# Patient Record
Sex: FEMALE | Race: BLACK OR AFRICAN AMERICAN | NOT HISPANIC OR LATINO | Employment: OTHER | URBAN - METROPOLITAN AREA
[De-identification: names, ages, dates, MRNs, and addresses within clinical notes are randomized per-mention and may not be internally consistent; named-entity substitution may affect disease eponyms.]

---

## 2018-03-07 ENCOUNTER — OFFICE VISIT (OUTPATIENT)
Dept: PODIATRY | Facility: CLINIC | Age: 63
End: 2018-03-07
Payer: MEDICARE

## 2018-03-07 VITALS — HEIGHT: 64 IN | WEIGHT: 293 LBS | BODY MASS INDEX: 50.02 KG/M2

## 2018-03-07 DIAGNOSIS — E11.9 COMPREHENSIVE DIABETIC FOOT EXAMINATION, TYPE 2 DM, ENCOUNTER FOR: Primary | ICD-10-CM

## 2018-03-07 DIAGNOSIS — L84 CORN OR CALLUS: ICD-10-CM

## 2018-03-07 DIAGNOSIS — M20.42 HAMMER TOES OF BOTH FEET: ICD-10-CM

## 2018-03-07 DIAGNOSIS — M20.41 HAMMER TOES OF BOTH FEET: ICD-10-CM

## 2018-03-07 DIAGNOSIS — E11.49 TYPE II DIABETES MELLITUS WITH NEUROLOGICAL MANIFESTATIONS: ICD-10-CM

## 2018-03-07 PROCEDURE — 99213 OFFICE O/P EST LOW 20 MIN: CPT | Mod: PBBFAC,PO | Performed by: PODIATRIST

## 2018-03-07 PROCEDURE — 99999 PR PBB SHADOW E&M-EST. PATIENT-LVL III: CPT | Mod: PBBFAC,,, | Performed by: PODIATRIST

## 2018-03-07 PROCEDURE — 99204 OFFICE O/P NEW MOD 45 MIN: CPT | Mod: S$PBB,,, | Performed by: PODIATRIST

## 2018-03-07 RX ORDER — ASPIRIN 325 MG
TABLET, DELAYED RELEASE (ENTERIC COATED) ORAL
COMMUNITY
Start: 2015-11-24

## 2018-03-07 RX ORDER — PROMETHAZINE HYDROCHLORIDE 25 MG/1
TABLET ORAL
COMMUNITY
Start: 2015-12-08

## 2018-03-07 RX ORDER — INSULIN ASPART 100 [IU]/ML
INJECTION, SOLUTION INTRAVENOUS; SUBCUTANEOUS
COMMUNITY
Start: 2015-12-23

## 2018-03-07 RX ORDER — LEVOCETIRIZINE DIHYDROCHLORIDE 5 MG/1
TABLET, FILM COATED ORAL
COMMUNITY
Start: 2015-12-24

## 2018-03-07 RX ORDER — OXYCODONE HYDROCHLORIDE 30 MG/1
TABLET ORAL
COMMUNITY
Start: 2018-03-02

## 2018-03-07 RX ORDER — FLUTICASONE PROPIONATE 50 MCG
SPRAY, SUSPENSION (ML) NASAL
COMMUNITY
Start: 2018-01-07

## 2018-03-07 RX ORDER — ALPRAZOLAM 1 MG/1
TABLET ORAL
COMMUNITY
Start: 2015-12-23

## 2018-03-07 RX ORDER — METOPROLOL TARTRATE 50 MG/1
TABLET ORAL
COMMUNITY
Start: 2015-11-30

## 2018-03-07 RX ORDER — PITAVASTATIN CALCIUM 2.09 MG/1
TABLET, FILM COATED ORAL
COMMUNITY
Start: 2017-11-30

## 2018-03-07 NOTE — PROGRESS NOTES
Subjective:     Patient ID: Sandra Sanders is a 62 y.o. female.    Chief Complaint: Diabetic Foot Exam (establishing care PCP Dr. Corral ) and Foot Pain (bilateral ball of foot pain left worse than right)    Sandra Gutierrez is a 62 y.o. female who presents to the clinic for evaluation and treatment of high risk feet. Sandra Gutierrez has a past medical history of CHF (congestive heart failure); Diabetes mellitus, type 2; and Hyperlipidemia. The patient's chief complaint is painfil left 4th toe. This patient has documented high risk feet requiring routine maintenance secondary to diabetes mellitis and those secondary complications of diabetes, as mentioned..    PCP: Juan Diego Corral MD    Date Last Seen by PCP: 1/4/2018    Current shoe gear:  Affected Foot: Extra depth shoes     Unaffected Foot: Extra depth shoes      Patient Active Problem List   Diagnosis    Type II diabetes mellitus with neurological manifestations    Hyperlipemia    CHF (congestive heart failure)       Medication List with Changes/Refills   Current Medications    ALPRAZOLAM (XANAX) 1 MG TABLET        CHOLECALCIFEROL, VITAMIN D3, (DECARA) 50,000 UNIT CAPSULE        FLUTICASONE (FLONASE) 50 MCG/ACTUATION NASAL SPRAY        INSULIN ASPART U-100 (NOVOLOG FLEXPEN U-100 INSULIN) 100 UNIT/ML INPN PEN        INSULIN DETEMIR U-100 (LEVEMIR FLEXTOUCH U-100 INSULN) 100 UNIT/ML (3 ML) SUBQ INPN PEN        LEVOCETIRIZINE (XYZAL) 5 MG TABLET        LIVALO 2 MG TAB TABLET        METOPROLOL TARTRATE (LOPRESSOR) 50 MG TABLET        PROMETHAZINE (PHENERGAN) 25 MG TABLET        QVAR 40 MCG/ACTUATION AERO    INHALE 2 PUFFS BY MOUTH TWICE DAILY    ROXICODONE 30 MG TAB           Review of patient's allergies indicates:  Allergies not on file    Past Surgical History:   Procedure Laterality Date    BYPASS GRAFT      HIP SURGERY      HYSTERECTOMY         History reviewed. No pertinent family history.    Social History     Social History    Marital status:   "    Spouse name: N/A    Number of children: N/A    Years of education: N/A     Occupational History    Not on file.     Social History Main Topics    Smoking status: Former Smoker    Smokeless tobacco: Never Used    Alcohol use Not on file    Drug use: Unknown    Sexual activity: Not on file     Other Topics Concern    Not on file     Social History Narrative    No narrative on file       Vitals:    03/07/18 1340   Weight: (!) 146.4 kg (322 lb 12.1 oz)   Height: 5' 4" (1.626 m)       Review of Systems   Constitutional: Negative for chills and fever.   Respiratory: Negative for shortness of breath.    Cardiovascular: Negative for chest pain, palpitations, orthopnea, claudication and leg swelling.   Gastrointestinal: Negative for diarrhea, nausea and vomiting.   Musculoskeletal: Negative for joint pain.   Skin: Negative for rash.   Neurological: Positive for tingling and sensory change. Negative for dizziness, focal weakness and weakness.   Psychiatric/Behavioral: Negative.              Objective:      PHYSICAL EXAM: Apperance: Alert and orient in no distress,well developed, and with good attention to grooming and body habits  Patient presents in a wheelchair.   LOWER EXTREMITY EXAM:  VASCULAR: Dorsalis pedis pulses 2/4 bilateral and Posterior Tibial pulses 1/4 bilateral. Capillary fill time <4 seconds bilateral. Mild edema observed bilateral. Varicosities absent bilateral. Skin temperature of the lower extremities is warm to warm, proximal to distal. Hair growth dim bilateral.  DERMATOLOGICAL: No skin rashes, subcutaneous nodules, lesions, or ulcers observed bilateral. Nails 1,2,3,4,5 bilateral normal lenght. Webspaces 1,2,3,4 clean, dry and without evidence of break in skin integrity bilateral. Mild hyperkeratotic tissue noted to left plantar 5th submetatarsal.   NEUROLOGICAL: Light touch, sharp-dull, proprioception all present and equal bilaterally.  Vibratory sensation absent at bilateral hallux. " Protective sensation absent at 4/10 sites as tested with a Hazelton-Sy 5.07 monofilament.   MUSCULOSKELETAL: Muscle strength is 5/5 for foot inverters, everters, plantarflexors, and dorsiflexors. Muscle tone is normal. Hammertoes noted bilateral.             Assessment:       Encounter Diagnoses   Name Primary?    Comprehensive diabetic foot examination, type 2 DM, encounter for Yes    Type II diabetes mellitus with neurological manifestations     Hammer toes of both feet     Corn or callus - Left Foot          Plan:   Comprehensive diabetic foot examination, type 2 DM, encounter for    Type II diabetes mellitus with neurological manifestations  -     DIABETIC SHOES FOR HOME USE    Hammer toes of both feet  -     DIABETIC SHOES FOR HOME USE    Corn or callus - Left Foot  -     DIABETIC SHOES FOR HOME USE      I counseled the patient on her conditions, regarding findings of my examination, my impressions, and usual treatment plan.   Greater than 50% of this visit spent on counseling and coordination of care.  Greater than 15 minutes of a 20 minute appointment spent on education about the diabetic foot, neuropathy, and prevention of limb loss.  Shoe inspection. Diabetic Foot Education. Patient reminded of the importance of good nutrition and blood sugar control to help prevent podiatric complications of diabetes. Patient instructed on proper foot hygeine. We discussed wearing proper shoe gear, daily foot inspections, never walking without protective shoe gear, never putting sharp instruments to feet.    Prescription written for Diabetic shoes and inserts.   Patient  will continue to monitor the areas daily, inspect feet, wear protective shoe gear when ambulatory, moisturizer to maintain skin integrity. Patient reminded of the importance of good nutrition and blood sugar control to help prevent podiatric complications of diabetes.  Patient to return 3 months or sooner if needed.                 Frances  Miles, DPM [facility]

## 2018-03-07 NOTE — PATIENT INSTRUCTIONS
[facility]    [address] [place] [address]  Phone: [phone] [address] [place] [address]   Phone: [phone] [address] [place] [address]   Phone: [phone] [address]  [place] [address]   Phone: [phone]    [address] [place] [address]   Phone: [phone] [address] [place] [address]   Phone: [phone]      [facility]    [address] [facility]  [place] [address]  Phone: [phone]  Fax: [phone]

## 2018-03-17 PROBLEM — E11.49 TYPE II DIABETES MELLITUS WITH NEUROLOGICAL MANIFESTATIONS: Status: ACTIVE | Noted: 2018-03-17

## 2018-03-17 PROBLEM — I50.9 CHF (CONGESTIVE HEART FAILURE): Status: ACTIVE | Noted: 2018-03-17

## 2018-03-17 PROBLEM — E78.5 HYPERLIPEMIA: Status: ACTIVE | Noted: 2018-03-17

## 2018-07-26 ENCOUNTER — TELEPHONE (OUTPATIENT)
Dept: PODIATRY | Facility: CLINIC | Age: 63
End: 2018-07-26

## 2018-07-26 NOTE — TELEPHONE ENCOUNTER
----- Message from Екатерина Ace sent at 7/26/2018  9:34 AM CDT -----  Contact: pt   Pt is requesting a call back from the nurse in regards to having some questions that she only wanted to talk about with the staff.                                               [phone] (home)

## 2018-07-26 NOTE — TELEPHONE ENCOUNTER
Ms. MINOO Sanders was given a return call, and she informed me that her PCP Doctor, Dr. TEJAL Corral informed her that he will be sending our office notes in regards to her foot. Call ended well.

## 2021-11-17 ENCOUNTER — IMMUNIZATION (OUTPATIENT)
Dept: PRIMARY CARE CLINIC | Facility: CLINIC | Age: 66
End: 2021-11-17
Payer: MEDICARE

## 2021-11-17 ENCOUNTER — LAB VISIT (OUTPATIENT)
Dept: PRIMARY CARE CLINIC | Facility: OTHER | Age: 66
End: 2021-11-17
Attending: INTERNAL MEDICINE
Payer: MEDICARE

## 2021-11-17 DIAGNOSIS — Z20.822 ENCOUNTER FOR LABORATORY TESTING FOR COVID-19 VIRUS: ICD-10-CM

## 2021-11-17 DIAGNOSIS — Z23 NEED FOR VACCINATION: Primary | ICD-10-CM

## 2021-11-17 PROCEDURE — U0003 INFECTIOUS AGENT DETECTION BY NUCLEIC ACID (DNA OR RNA); SEVERE ACUTE RESPIRATORY SYNDROME CORONAVIRUS 2 (SARS-COV-2) (CORONAVIRUS DISEASE [COVID-19]), AMPLIFIED PROBE TECHNIQUE, MAKING USE OF HIGH THROUGHPUT TECHNOLOGIES AS DESCRIBED BY CMS-2020-01-R: HCPCS | Performed by: INTERNAL MEDICINE

## 2021-11-17 PROCEDURE — 0064A COVID-19, MRNA, LNP-S, PF, 100 MCG/0.25 ML DOSE VACCINE (MODERNA BOOSTER): CPT | Mod: CV19,PBBFAC | Performed by: FAMILY MEDICINE

## 2021-11-18 LAB
SARS-COV-2 RNA RESP QL NAA+PROBE: NOT DETECTED
SARS-COV-2- CYCLE NUMBER: NORMAL

## 2024-05-29 ENCOUNTER — OFFICE VISIT (OUTPATIENT)
Dept: URGENT CARE | Facility: CLINIC | Age: 69
End: 2024-05-29
Payer: MEDICARE

## 2024-05-29 VITALS
DIASTOLIC BLOOD PRESSURE: 75 MMHG | RESPIRATION RATE: 16 BRPM | HEART RATE: 79 BPM | WEIGHT: 293 LBS | HEIGHT: 64 IN | OXYGEN SATURATION: 96 % | SYSTOLIC BLOOD PRESSURE: 112 MMHG | TEMPERATURE: 98 F | BODY MASS INDEX: 50.02 KG/M2

## 2024-05-29 DIAGNOSIS — Z20.822 COVID-19 VIRUS NOT DETECTED: Primary | ICD-10-CM

## 2024-05-29 LAB
CTP QC/QA: YES
SARS-COV-2 AG RESP QL IA.RAPID: NEGATIVE

## 2024-05-29 PROCEDURE — 87811 SARS-COV-2 COVID19 W/OPTIC: CPT | Mod: QW,S$GLB,, | Performed by: NURSE PRACTITIONER

## 2024-05-29 PROCEDURE — 99203 OFFICE O/P NEW LOW 30 MIN: CPT | Mod: S$GLB,,, | Performed by: NURSE PRACTITIONER

## 2024-05-29 NOTE — PROGRESS NOTES
"Subjective:      Patient ID: Sandra Sanders is a 68 y.o. female.    Vitals:  height is 5' 4" (1.626 m) and weight is 146.1 kg (322 lb) (abnormal). Her temperature is 98.4 °F (36.9 °C). Her blood pressure is 112/75 and her pulse is 79. Her respiration is 16 and oxygen saturation is 96%.     Chief Complaint: Covid Test    68-year-old female presents for COVID testing.  She was diagnosed with COVID on May 16, 2024.  She is supposed to see pain management today and they requested that she get a COVID test prior to arrival.  She denies any symptoms today.  She states she overall feels much better.        Constitution: Negative for activity change, appetite change, chills, sweating, fatigue, fever and generalized weakness.   HENT:  Negative for ear pain, ear discharge, congestion, postnasal drip, sinus pain, sinus pressure and sore throat.    Neck: Negative for neck pain and neck stiffness.   Cardiovascular:  Negative for chest pain and sob on exertion.   Eyes:  Negative for eye discharge, eye itching and eye pain.   Respiratory:  Negative for chest tightness, cough, sputum production, COPD, shortness of breath and wheezing.    Gastrointestinal:  Negative for abdominal pain, nausea, vomiting, constipation and diarrhea.   Genitourinary:  Negative for dysuria, frequency, urgency and flank pain.   Musculoskeletal:  Negative for pain.   Skin:  Negative for rash.   Neurological:  Negative for dizziness, light-headedness, coordination disturbances, headaches, disorientation, altered mental status, loss of consciousness, numbness, tingling, seizures and tremors.   Psychiatric/Behavioral:  Negative for altered mental status and disorientation.       Objective:     Physical Exam   Constitutional: She is oriented to person, place, and time.   HENT:   Head: Normocephalic and atraumatic.   Eyes: Pupils are equal, round, and reactive to light.   Cardiovascular: Normal rate and normal heart sounds.   Pulmonary/Chest: Effort " normal. No stridor. No respiratory distress. She has no wheezes. She has no rhonchi. She has no rales. She exhibits no tenderness.   Abdominal: Normal appearance. She exhibits no distension.   Neurological: She is alert and oriented to person, place, and time.   Psychiatric: Mood normal.       Assessment:     1. COVID-19 virus not detected        Plan:     COVID test negative.    COVID-19 virus not detected  -     SARS Coronavirus 2 Antigen, POCT Manual Read             Additional MDM:     Heart Failure Score:   COPD = No

## 2024-09-19 ENCOUNTER — TELEPHONE (OUTPATIENT)
Dept: ORTHOPEDICS | Facility: CLINIC | Age: 69
End: 2024-09-19
Payer: MEDICARE

## 2024-09-19 DIAGNOSIS — M25.551 BILATERAL HIP PAIN: Primary | ICD-10-CM

## 2024-09-19 DIAGNOSIS — M25.552 BILATERAL HIP PAIN: Primary | ICD-10-CM

## 2024-09-19 NOTE — TELEPHONE ENCOUNTER
Returned the patient's phone call in regards to their message. Informed the patient that Dr. Ramon is completely booked until January. Informed the patient that I can get them schedule with Dr. Ramon's PA for a sooner appointment. I got the patient schedule to see Mrs. Astrid Meyers PA-C on 10/17/24 at 8:00 with a 7:30 x-ray. I also got the patient schedule to see Dr. Ramon on 01/06/25 at 3:00 to discuss surgery. Patient verbalized understanding.

## 2024-09-19 NOTE — TELEPHONE ENCOUNTER
----- Message from Doranellie Pandya sent at 9/19/2024  7:33 AM CDT -----  Contact: [phone]  [email or web address]     Patient is calling for Medical Advice regarding:referral to schedule     How long has patient had these symptoms:    Pharmacy name and phone#:    Patient wants a call back or thru [facility]:call back     Comments:  Pt is calling she states she was given a referral to schedule with you all please advise it is an outside referral   Please advise patient replies from provider may take up to 48 hours.

## 2024-10-17 ENCOUNTER — OFFICE VISIT (OUTPATIENT)
Dept: ORTHOPEDICS | Facility: CLINIC | Age: 69
End: 2024-10-17
Payer: MEDICARE

## 2024-10-17 ENCOUNTER — HOSPITAL ENCOUNTER (OUTPATIENT)
Dept: RADIOLOGY | Facility: HOSPITAL | Age: 69
Discharge: HOME | End: 2024-10-17
Attending: PHYSICIAN ASSISTANT
Payer: MEDICARE

## 2024-10-17 VITALS
HEART RATE: 102 BPM | SYSTOLIC BLOOD PRESSURE: 106 MMHG | HEIGHT: 63 IN | DIASTOLIC BLOOD PRESSURE: 72 MMHG | WEIGHT: 260 LBS | BODY MASS INDEX: 46.07 KG/M2

## 2024-10-17 DIAGNOSIS — M25.552 BILATERAL HIP PAIN: ICD-10-CM

## 2024-10-17 DIAGNOSIS — M16.11 PRIMARY OSTEOARTHRITIS OF RIGHT HIP: Primary | ICD-10-CM

## 2024-10-17 DIAGNOSIS — M25.551 BILATERAL HIP PAIN: ICD-10-CM

## 2024-10-17 DIAGNOSIS — Z96.642 HISTORY OF ARTHROPLASTY OF LEFT HIP: ICD-10-CM

## 2024-10-17 DIAGNOSIS — M54.31 SCIATICA OF RIGHT SIDE: ICD-10-CM

## 2024-10-17 PROCEDURE — 73521 X-RAY EXAM HIPS BI 2 VIEWS: CPT | Mod: 26,,, | Performed by: RADIOLOGY

## 2024-10-17 PROCEDURE — 99203 OFFICE O/P NEW LOW 30 MIN: CPT | Mod: S$PBB,,, | Performed by: PHYSICIAN ASSISTANT

## 2024-10-17 PROCEDURE — 99215 OFFICE O/P EST HI 40 MIN: CPT | Mod: PBBFAC,25 | Performed by: PHYSICIAN ASSISTANT

## 2024-10-17 PROCEDURE — 99999 PR PBB SHADOW E&M-EST. PATIENT-LVL V: CPT | Mod: PBBFAC,,, | Performed by: PHYSICIAN ASSISTANT

## 2024-10-17 PROCEDURE — 73521 X-RAY EXAM HIPS BI 2 VIEWS: CPT | Mod: TC

## 2024-10-17 RX ORDER — CETIRIZINE HYDROCHLORIDE 10 MG/1
1 TABLET ORAL DAILY
COMMUNITY
Start: 2024-06-10

## 2024-10-17 RX ORDER — DEXLANSOPRAZOLE 60 MG/1
60 CAPSULE, DELAYED RELEASE ORAL DAILY
COMMUNITY

## 2024-10-17 RX ORDER — GLYCERIN 0.25 %
DROPS OPHTHALMIC (EYE)
COMMUNITY
Start: 2024-05-16

## 2024-10-17 RX ORDER — NITROGLYCERIN 0.4 MG/1
0.4 TABLET SUBLINGUAL
COMMUNITY
Start: 2023-11-30

## 2024-10-17 RX ORDER — DEXTROSE 4 G
TABLET,CHEWABLE ORAL
COMMUNITY
Start: 2024-02-20

## 2024-10-17 RX ORDER — DICYCLOMINE HYDROCHLORIDE 20 MG/1
20 TABLET ORAL
COMMUNITY

## 2024-10-17 RX ORDER — PREDNISONE 5 MG/1
5 TABLET ORAL
COMMUNITY
Start: 2024-10-09

## 2024-10-17 RX ORDER — NITROFURANTOIN 25; 75 MG/1; MG/1
100 CAPSULE ORAL 2 TIMES DAILY
COMMUNITY
Start: 2024-08-09

## 2024-10-17 RX ORDER — TIRZEPATIDE 10 MG/.5ML
10 INJECTION, SOLUTION SUBCUTANEOUS
COMMUNITY

## 2024-10-17 RX ORDER — FLUCONAZOLE 150 MG/1
150 TABLET ORAL
COMMUNITY
Start: 2024-08-09

## 2024-10-17 RX ORDER — DOCUSATE SODIUM 100 MG/1
1 CAPSULE, LIQUID FILLED ORAL 2 TIMES DAILY PRN
COMMUNITY

## 2024-10-17 RX ORDER — METHOTREXATE 2.5 MG/1
8 TABLET ORAL WEEKLY
COMMUNITY
Start: 2024-04-30

## 2024-10-17 RX ORDER — NALOXEGOL OXALATE 12.5 MG/1
1 TABLET, FILM COATED ORAL EVERY MORNING
COMMUNITY

## 2024-10-17 RX ORDER — CLOPIDOGREL BISULFATE 75 MG/1
75 TABLET ORAL DAILY
COMMUNITY

## 2024-10-17 RX ORDER — BUSPIRONE HYDROCHLORIDE 7.5 MG/1
7.5 TABLET ORAL DAILY
COMMUNITY
Start: 2024-09-24

## 2024-10-17 RX ORDER — GABAPENTIN 100 MG/1
1 CAPSULE ORAL 2 TIMES DAILY
COMMUNITY
Start: 2024-07-30

## 2024-10-17 RX ORDER — VORTIOXETINE 10 MG/1
1 TABLET, FILM COATED ORAL DAILY
COMMUNITY
Start: 2024-04-16

## 2024-10-17 RX ORDER — FOLIC ACID 1 MG/1
1 TABLET ORAL EVERY MORNING
COMMUNITY
Start: 2023-11-07

## 2024-10-17 RX ORDER — FUROSEMIDE 40 MG/1
1 TABLET ORAL 2 TIMES DAILY
COMMUNITY
Start: 2024-05-06

## 2024-10-17 RX ORDER — GUAIFENESIN 600 MG/1
600 TABLET, EXTENDED RELEASE ORAL
COMMUNITY
Start: 2024-05-16

## 2024-10-17 RX ORDER — ONDANSETRON 8 MG/1
8 TABLET, ORALLY DISINTEGRATING ORAL
COMMUNITY
Start: 2024-05-30

## 2024-10-17 RX ORDER — MUPIROCIN 20 MG/G
OINTMENT TOPICAL 3 TIMES DAILY
COMMUNITY
Start: 2024-06-03

## 2024-10-17 NOTE — PROGRESS NOTES
Patient ID: Sandra Sanders is a 69 y.o. female.    Chief Complaint: Pain of the Right Hip      HPI: Sandra Sanders  is a 69 y.o. female who c/o Pain of the Right Hip       Patient presents as a new patient to me today with chief complaint of right hip pain.  Patient notes pain is at worst a 10/10 affecting activity of daily living and thus her quality of life.  She presents today in a wheelchair she was unsure how long she had to walk.  Patient states she is only able to ambulate with a walker and even then short distances at best.  She has a hard time going from a sitting to standing or standing to seated position, unlevel terrain or stairs.  The patient states she has been struggling with her hip for some time and was actually due to have a hip replacement shortly after her left hip which performed in 2009 by Dr. [facility].  She states given the complication and having to go back into surgery her and her  decided to wait on the right until it became completely unbearable which it has been.  She has been followed by Dr. Fonseca at [facility] at outside facility been in therapy since 2017 with Prasanth doing aqua therapy 3 times a week, received multiple hip injections as well as a variety of medications with minimal at best relief.  The patient states she was deemed not a surgical candidate after losing 80 lb by his office and it was started she can no longer live like this at her age once more for her life.  She reaches out today for a 2nd opinion.      Patient underwent left total hip by Dr. [facility] December of 2009 per [facility] chart review.  Patient was taken back roughly 1 week later for concern of hematoma and washout was performed.  No infection was noted at the time.  She states she has had no pain, injury, complication or complaint of the left hip    Patient has been in therapy with Jeanine BRIGHT since 2017 doing aqua therapy 3 times a week  She has received multiple hip injections with minimal at  best relief  Additionally she does note sciatica to the left side radiating down her leg and knows she has back issues     Additionally patient had a double bypass in 2013 and is followed by Dr. Khanna for cardiology   She has changed her PCP to Dr. Martin in as Dr. Nelson has retired    Patient is presently denying any shortness of breath, chest pain, fever/chills, nausea/vomiting, loss of taste or smell, numbness/tingling or sensation changes, loss of bladder or bowel function.    Past Medical History:   Diagnosis Date    CHF (congestive heart failure)     Diabetes mellitus, type 2     Hyperlipidemia        Past Surgical History:   Procedure Laterality Date    BYPASS GRAFT      CHOLECYSTECTOMY      HIP SURGERY      HYSTERECTOMY         No family history on file.    Social History     Socioeconomic History    Marital status:    Tobacco Use    Smoking status: Former    Smokeless tobacco: Never     Social Drivers of Health     Transportation Needs: Unknown (4/15/2022)    Received from [facility]    [facility]     Lack of Transportation (Non-Medical): No       Medication List with Changes/Refills   Current Medications    ALPRAZOLAM (XANAX) 1 MG TABLET        BLOOD SUGAR DIAGNOSTIC (TRUE METRIX GLUCOSE TEST STRIP) STRP    USE AS DIRECTED FOR GLUCOSE MONITORING THREE TIMES DAILY    BLOOD-GLUCOSE METER (TRUE METRIX GLUCOSE METER) MISC    USE AS DIRECTED TO CHECK GLUCOSE TESTING THREE TIMES DAILY    BUSPIRONE (BUSPAR) 7.5 MG TABLET    Take 7.5 mg by mouth once daily.    CETIRIZINE (ZYRTEC) 10 MG TABLET    Take 1 tablet by mouth once daily.    CHOLECALCIFEROL, VITAMIN D3, (DECARA) 50,000 UNIT CAPSULE        CLOPIDOGREL (PLAVIX) 75 MG TABLET    Take 75 mg by mouth once daily.    DEXLANSOPRAZOLE (DEXILANT) 60 MG CAPSULE    Take 60 mg by mouth once daily.    DICYCLOMINE (BENTYL) 20 MG TABLET    Take 20 mg by mouth as needed.     DOCUSATE SODIUM (COLACE) 100 MG CAPSULE    Take 1 capsule by mouth 2 (two) times daily as needed.    FLUCONAZOLE (DIFLUCAN) 150 MG TAB    Take 150 mg by mouth as needed.    FLUTICASONE (FLONASE) 50 MCG/ACTUATION NASAL SPRAY        FOLIC ACID (FOLVITE) 1 MG TABLET    Take 1 tablet by mouth every morning.    FUROSEMIDE (LASIX) 40 MG TABLET    Take 1 tablet by mouth 2 (two) times daily.    GABAPENTIN (NEURONTIN) 100 MG CAPSULE    Take 1 capsule by mouth 2 (two) times daily.    GUAIFENESIN (MUCINEX) 600 MG 12 HR TABLET    Take 600 mg by mouth as needed.    INSULIN ASPART U-100 (NOVOLOG FLEXPEN U-100 INSULIN) 100 UNIT/ML INPN PEN        INSULIN DETEMIR U-100 (LEVEMIR FLEXTOUCH U-100 INSULN) 100 UNIT/ML (3 ML) SUBQ INPN PEN        LACTOBAC. RHAMNOSUS GG-INULIN 10 BILLION CELL -200 MG CPSP    Take by mouth once daily.    LEVOCETIRIZINE (XYZAL) 5 MG TABLET        LIVALO 2 MG TAB TABLET        METHOTREXATE 2.5 MG TAB    Take 8 tablets by mouth once a week.    METOPROLOL TARTRATE (LOPRESSOR) 50 MG TABLET        MOVANTIK 12.5 MG TAB    Take 1 tablet by mouth every morning.    MUPIROCIN (BACTROBAN) 2 % OINTMENT    Apply topically 3 (three) times daily.    NITROFURANTOIN, MACROCRYSTAL-MONOHYDRATE, (MACROBID) 100 MG CAPSULE    Take 100 mg by mouth 2 (two) times daily.    NITROGLYCERIN (NITROSTAT) 0.4 MG SL TABLET    Place 0.4 mg under the tongue.    ONDANSETRON (ZOFRAN-ODT) 8 MG TBDL    Take 8 mg by mouth as needed.    PHENOL-GLYCERIN (CHLORASEPTIC MAX SORE THROAT) 1.5-33 % SPRY    SPRAY  .    PREDNISONE (DELTASONE) 5 MG TABLET    Take 5 mg by mouth as needed.    PROMETHAZINE (PHENERGAN) 25 MG TABLET        QVAR 40 MCG/ACTUATION AERO    INHALE 2 PUFFS BY MOUTH TWICE DAILY    ROXICODONE 30 MG TAB        TIRZEPATIDE (MOUNJARO) 10 MG/0.5 ML PNIJ    Inject 10 mg into the skin every 7 days.    TRINTELLIX 10 MG TAB    Take 1 tablet by mouth once daily.       Review of patient's allergies indicates:   Allergen Reactions    Insulin  "lispro     Insulin regular human     Metformin     Nsaids (non-steroidal anti-inflammatory drug)     Statins-hmg-coa reductase inhibitors          Objective:     Right Lower Extremity    HIP:  Pelvis equal   Pain to palpation along the greater troch  Groin pain with both internal and external rotation  Abbductors/Adductors 5/5  Quad resistance 5/5    IMAGING:    XRAY:  Postsurgical changes following total left hip arthroplasty are visible.  Hardware is in satisfactory position with no lucency suspicious for prosthesis loosening, fracture or dislocation identified.  There is advanced chronic degenerative change visible at the right hip including severe joint space narrowing with large osteophytes at the margins of the acetabulum.  No fracture or dislocation is visible.  No focal bone lesions are identified.        Impression:     1.  Advanced chronic osteoarthritic changes at the right hip.  2.  Status post left hip arthroplasty.  3.  No acute abnormalities are appreciated.    [facility] documents surgical report from 2009          Assessment:       Encounter Diagnoses   Name Primary?    Primary osteoarthritis of right hip Yes    History of arthroplasty of left hip     BMI 45.0-49.9, adult     Sciatica of right side           Plan:       Sandra Gutierrez "Sandra" was seen today for pain.    Diagnoses and all orders for this visit:    Primary osteoarthritis of right hip    History of arthroplasty of left hip    BMI 45.0-49.9, adult    Sciatica of right side        Sandra Sanders is a new patient who presents to me today with chief complaint of right hip pain. We had a long discussion today regarding degenerative arthritis. The patient understands that arthritis is chronic and will worsen over time.  The patient also understands that arthritis may cause episodic flare-ups in pain. Management or if arthritis is achieved through a multi-modal approach including weight loss in obese individuals, activity modification, " NSAIDs (topical vs oral) where appropriate, periodic intra-articular steroid injections, viscosupplementation, physical therapy, knee bracing, ambulatory aids, as well as geniculate nerve blocks.  Patient has been in PT since 2017 doing aqua therapy 3 times a week, she has received multiple hip injections as well as non operative means of treatment and failed advancement of ADLs for a higher quality of life.  We discussed pre postop and procedural ongoing of a right total hip today.  Given her cardiac history I ask that she reach out to her established cardiologist to receive clearance for right total hip.  Additionally we will get her placed on Dr. Ramon  schedule for further evaluation and discuss of right total hip.    Dr. Ramon is aware of the patient & current presentation. He agrees with the current plan above.     Patient verbalized understanding of all instructions and agreed with the above plan.    No follow-ups on file.    The patient understands, chooses and consents to this plan and accepts all   the risks which include but are not limited to the risks mentioned above.     Disclaimer: This note was prepared using a voice recognition system and is likely to have sound alike errors within the text.

## 2024-10-22 ENCOUNTER — TELEPHONE (OUTPATIENT)
Dept: ORTHOPEDICS | Facility: CLINIC | Age: 69
End: 2024-10-22
Payer: MEDICARE

## 2024-10-22 NOTE — TELEPHONE ENCOUNTER
----- Message from Leanne sent at 10/22/2024  1:43 PM CDT -----  Contact: Ana / Dr. Khanna Office  Questions about her cardiac clearance. Please call her at [phone].    Thanks  Sl

## 2025-01-06 ENCOUNTER — OFFICE VISIT (OUTPATIENT)
Dept: ORTHOPEDICS | Facility: CLINIC | Age: 70
End: 2025-01-06
Payer: MEDICARE

## 2025-01-06 VITALS — BODY MASS INDEX: 46.06 KG/M2 | WEIGHT: 259.94 LBS | HEIGHT: 63 IN

## 2025-01-06 DIAGNOSIS — M16.11 ARTHRITIS OF RIGHT HIP: Primary | ICD-10-CM

## 2025-01-06 DIAGNOSIS — E66.01 MORBID OBESITY WITH BMI OF 45.0-49.9, ADULT: ICD-10-CM

## 2025-01-06 DIAGNOSIS — Z96.642 HX OF TOTAL HIP ARTHROPLASTY, LEFT: ICD-10-CM

## 2025-01-06 PROBLEM — I15.2 HYPERTENSION ASSOCIATED WITH DIABETES: Status: ACTIVE | Noted: 2018-10-14

## 2025-01-06 PROBLEM — N39.0 URINARY TRACT INFECTION: Status: ACTIVE | Noted: 2024-12-17

## 2025-01-06 PROBLEM — I25.10 ARTERIOSCLEROSIS OF CORONARY ARTERY: Status: ACTIVE | Noted: 2025-01-06

## 2025-01-06 PROBLEM — E11.9 TYPE 2 DIABETES MELLITUS: Status: ACTIVE | Noted: 2025-01-06

## 2025-01-06 PROBLEM — F32.1 CURRENT MODERATE EPISODE OF MAJOR DEPRESSIVE DISORDER WITHOUT PRIOR EPISODE: Status: ACTIVE | Noted: 2021-03-15

## 2025-01-06 PROBLEM — M81.0 OSTEOPOROSIS: Status: ACTIVE | Noted: 2025-01-06

## 2025-01-06 PROBLEM — G93.2 BENIGN INTRACRANIAL HYPERTENSION: Status: ACTIVE | Noted: 2021-11-08

## 2025-01-06 PROBLEM — E11.59 HYPERTENSION ASSOCIATED WITH DIABETES: Status: ACTIVE | Noted: 2018-10-14

## 2025-01-06 PROBLEM — M54.9 BACKACHE: Status: ACTIVE | Noted: 2021-11-08

## 2025-01-06 PROBLEM — G62.9 POLYNEUROPATHY: Status: ACTIVE | Noted: 2024-06-19

## 2025-01-06 PROBLEM — F11.20 CHRONIC NARCOTIC DEPENDENCE: Status: ACTIVE | Noted: 2022-04-14

## 2025-01-06 PROBLEM — K21.9 GASTROESOPHAGEAL REFLUX DISEASE: Status: ACTIVE | Noted: 2025-01-06

## 2025-01-06 PROBLEM — K57.32 DIVERTICULITIS OF COLON: Status: ACTIVE | Noted: 2020-08-28

## 2025-01-06 PROCEDURE — 99214 OFFICE O/P EST MOD 30 MIN: CPT | Mod: S$PBB,,, | Performed by: ORTHOPAEDIC SURGERY

## 2025-01-06 PROCEDURE — 99999 PR PBB SHADOW E&M-EST. PATIENT-LVL IV: CPT | Mod: PBBFAC,,, | Performed by: ORTHOPAEDIC SURGERY

## 2025-01-06 PROCEDURE — 99214 OFFICE O/P EST MOD 30 MIN: CPT | Mod: PBBFAC | Performed by: ORTHOPAEDIC SURGERY

## 2025-01-06 RX ORDER — ALPRAZOLAM 0.5 MG/1
TABLET ORAL
COMMUNITY

## 2025-01-06 RX ORDER — TIRZEPATIDE 7.5 MG/.5ML
INJECTION, SOLUTION SUBCUTANEOUS
COMMUNITY
Start: 2024-05-22

## 2025-01-06 NOTE — PROGRESS NOTES
Subjective:     Patient ID: Sandra Sanders is a 69 y.o. female.    Chief Complaint: Pain of the Left Hip and Pain of the Right Hip    HPI:  01/06/2025    Patient presents as a new patient to me today with chief complaint of right hip pain.  Patient notes pain is at worst a 10/10 affecting activity of daily living and thus her quality of life.  She presents today in a wheelchair she was unsure how long she had to walk.  Patient states she is only able to ambulate with a walker /Rollator and even then short distances at best.  She has a hard time going from a sitting to standing or standing to seated position, unlevel terrain or stairs.  Difficulty with putting shoes and socks.  The patient states she has been struggling with her hip for some time and was actually due to have a hip replacement shortly after her left hip which performed in 2009 by Dr. J [facility].  She states given the complication and having to go back into surgery her and her  decided to wait on the right until it became completely unbearable which it has been.  Patient  has passed away and she is on her own.  She does have a daughter that could help.  She has been followed by Dr. Voss and Dr. Ocampo who wanted her to lose weight prior to surgery and wanted to fix her left shoulder.  Has been in therapy since 2017 with Pr[facility] doing aqua therapy 3 times a week, received multiple hip injections as well as a variety of medications with minimal at best relief.  The patient states she was deemed not a surgical candidate after losing 80 lb by his office and it was started she can no longer live like this at her age once more for her life.  She reaches out today for a 2nd opinion.     She is under pain management receives her oxycodone, prednisone as needed, methotrexate and diagnosed with peripheral neuropathy and on gabapentin.  Patient underwent left total hip by Dr. J [facility] December of 2009 per [facility] chart review.  Patient was  taken back roughly 1 week later for concern of hematoma and washout was performed.  No infection was noted at the time.  She states she has had no pain, injury, complication or complaint of the left hip     Patient has been in therapy with [facility] since 2017 doing aqua therapy 3 times a week  She has received multiple hip injections with minimal at best relief  Additionally she does note sciatica to the left side radiating down her leg and knows she has back issues      Additionally patient had a double bypass in  and is followed by Dr. Khanna for cardiology   She has changed her PCP to Dr. Tuttle  in as Dr. Iverson has retired     Patient is presently denying any shortness of breath, chest pain, fever/chills, nausea/vomiting, difficulty with chewing or swallowing loss of bowel bladder control blurry vision double vision loss sense smell or taste  Patient is here with a daughter/nurse Althea    Patient had open heart surgery in .  Had stress test recently by Dr. lionel Uribe .  It weights clearance to undergo surgery      Past Medical History:   Diagnosis Date    CHF (congestive heart failure)     Diabetes mellitus, type 2     Hyperlipidemia      Past Surgical History:   Procedure Laterality Date    BYPASS GRAFT      CHOLECYSTECTOMY      HIP SURGERY Left         HYSTERECTOMY       No family history on file.  Social History     Socioeconomic History    Marital status:    Tobacco Use    Smoking status: Former    Smokeless tobacco: Never     Social Drivers of Health     Transportation Needs: Unknown (4/15/2022)    Received from [facility] Sub[facility]idi[facility] and Affiliates    [facility] - Transportation     Lack of Transportation (Non-Medical): No     Medication List with Changes/Refills   Current Medications    ALPRAZOLAM (XANAX) 0.5 MG TABLET    SMARTSI Tablet(s) By Mouth Morning-Evening    ALPRAZOLAM (XANAX) 1 MG TABLET        BLOOD SUGAR DIAGNOSTIC (TRUE  METRIX GLUCOSE TEST STRIP) STRP    USE AS DIRECTED FOR GLUCOSE MONITORING THREE TIMES DAILY    BLOOD-GLUCOSE METER (TRUE METRIX GLUCOSE METER) MISC    USE AS DIRECTED TO CHECK GLUCOSE TESTING THREE TIMES DAILY    BUSPIRONE (BUSPAR) 7.5 MG TABLET    Take 7.5 mg by mouth once daily.    CETIRIZINE (ZYRTEC) 10 MG TABLET    Take 1 tablet by mouth once daily.    CHOLECALCIFEROL, VITAMIN D3, (DECARA) 50,000 UNIT CAPSULE        CLOPIDOGREL (PLAVIX) 75 MG TABLET    Take 75 mg by mouth once daily.    DEXLANSOPRAZOLE (DEXILANT) 60 MG CAPSULE    Take 60 mg by mouth once daily.    DICYCLOMINE (BENTYL) 20 MG TABLET    Take 20 mg by mouth as needed.    DOCUSATE SODIUM (COLACE) 100 MG CAPSULE    Take 1 capsule by mouth 2 (two) times daily as needed.    FLUCONAZOLE (DIFLUCAN) 150 MG TAB    Take 150 mg by mouth as needed.    FLUTICASONE (FLONASE) 50 MCG/ACTUATION NASAL SPRAY        FOLIC ACID (FOLVITE) 1 MG TABLET    Take 1 tablet by mouth every morning.    FUROSEMIDE (LASIX) 40 MG TABLET    Take 1 tablet by mouth 2 (two) times daily.    GABAPENTIN (NEURONTIN) 100 MG CAPSULE    Take 1 capsule by mouth 2 (two) times daily.    GUAIFENESIN (MUCINEX) 600 MG 12 HR TABLET    Take 600 mg by mouth as needed.    INSULIN ASPART U-100 (NOVOLOG FLEXPEN U-100 INSULIN) 100 UNIT/ML INPN PEN        INSULIN DETEMIR U-100 (LEVEMIR FLEXTOUCH U-100 INSULN) 100 UNIT/ML (3 ML) SUBQ INPN PEN        LACTOBAC. RHAMNOSUS GG-INULIN 10 BILLION CELL -200 MG CPSP    Take by mouth once daily.    LEVOCETIRIZINE (XYZAL) 5 MG TABLET        LIVALO 2 MG TAB TABLET        METHOTREXATE 2.5 MG TAB    Take 8 tablets by mouth once a week.    METOPROLOL TARTRATE (LOPRESSOR) 50 MG TABLET        MOUNJARO 7.5 MG/0.5 ML PNIJ    INJECT 7.5MG UNDER THE SKIN EVERY 7 DAYS    MOVANTIK 12.5 MG TAB    Take 1 tablet by mouth every morning.    MUPIROCIN (BACTROBAN) 2 % OINTMENT    Apply topically 3 (three) times daily.    NITROFURANTOIN, MACROCRYSTAL-MONOHYDRATE, (MACROBID) 100 MG  CAPSULE    Take 100 mg by mouth 2 (two) times daily.    NITROGLYCERIN (NITROSTAT) 0.4 MG SL TABLET    Place 0.4 mg under the tongue.    ONDANSETRON (ZOFRAN-ODT) 8 MG TBDL    Take 8 mg by mouth as needed.    PHENOL-GLYCERIN (CHLORASEPTIC MAX SORE THROAT) 1.5-33 % SPRY    SPRAY  .    PREDNISONE (DELTASONE) 5 MG TABLET    Take 5 mg by mouth as needed.    PROMETHAZINE (PHENERGAN) 25 MG TABLET        QVAR 40 MCG/ACTUATION AERO    INHALE 2 PUFFS BY MOUTH TWICE DAILY    ROXICODONE 30 MG TAB        TRINTELLIX 10 MG TAB    Take 1 tablet by mouth once daily.   Discontinued Medications    TIRZEPATIDE (MOUNJARO) 10 MG/0.5 ML PNIJ    Inject 10 mg into the skin every 7 days.     Review of patient's allergies indicates:   Allergen Reactions    Insulin lispro     Insulin regular human     Metformin     Nsaids (non-steroidal anti-inflammatory drug)     Statins-hmg-coa reductase inhibitors      Review of Systems   Constitutional: Negative for decreased appetite.   HENT:  Negative for tinnitus.    Eyes:  Negative for double vision.   Cardiovascular:  Negative for chest pain.   Respiratory:  Negative for wheezing.    Hematologic/Lymphatic: Negative for bleeding problem.   Skin:  Negative for dry skin.   Musculoskeletal:  Positive for arthritis, back pain, joint pain, muscle weakness, myalgias and stiffness. Negative for gout, joint swelling and neck pain.   Gastrointestinal:  Negative for abdominal pain.   Genitourinary:  Negative for bladder incontinence.   Neurological:  Negative for numbness, paresthesias and sensory change.   Psychiatric/Behavioral:  Negative for altered mental status.      Objective:   Body mass index is 46.04 kg/m².  There were no vitals filed for this visit.       General    Constitutional: She is oriented to person, place, and time. She appears well-developed.   HENT:   Head: Atraumatic.   Eyes: EOM are normal.   Pulmonary/Chest: Effort normal.   Neurological: She is alert and oriented to person, place, and  time.   Psychiatric: Judgment normal.         In a wheelchair.  She has a Rollator at home.    Pelvis in the sitting position is level   Left hip posterior approach passive internal external rotation with excellent motion no pain in the groin.  Able to do hip flexing and abducting and adducting with strength at 5/5  Right hip is stuck at 0° of internal rotation with pelvis tilting.  External rotation 25° abduction 30° with around 15-20 degrees of flexion contracture.  There is quite a bit of pain in the posterior hip joint and slightly anterior hip joint.  Bilateral knees full extension and flexion.  No defect in the patella or quadriceps tendon.  Collaterals and cruciates stable.    Ankle motion is intact it goes up and down without difficulty.  There is some mild pitting edema  Skin is warm to touch no obvious lesions  Relevant imaging results reviewed and interpreted by me, discussed with the patient and / or family today     X-ray 10/17/2024 left BORIS/Dillan according to the patient in excellent alignment.  No evidence of poly wear.  X-ray 10/17/2024 right hip with complete loss of joint space.  Basically fused hip.  Cystic changes in the bone osteophytic changes osteopenic bone consistent with severe arthritis of the right hip  Assessment:     Encounter Diagnoses   Name Primary?    Arthritis of right hip Yes    Hx of total hip arthroplasty, left     Morbid obesity with BMI of 45.0-49.9, adult         Plan:   Arthritis of right hip    Hx of total hip arthroplasty, left    Morbid obesity with BMI of 45.0-49.9, adult         Patient Instructions   X-ray show severe arthritis of her right hip your left total hip is still doing really well  Your exam show severe limitation of motion on the right hip and pelvis that tilts with motion as far as the left hip is concerned is really moving well without pain  You been through pain management over the last several years.  You have rheumatoid arthritis you are on  methotrexate and prednisone as needed.  You do have major cardiac issues in you seeing Dr. Khanna who did a stress test on you recently and he is probably would clear you for surgery.  You also taking her oxycodone because of the pain and you are on gabapentin which you take on occasions.  You have a Rollator at home.  You are losing weight now you are on Mounjaro on you did have weight loss surgery and you can not take anti-inflammatories because of the weight loss surgery  Total hip replacement on the right side is indicated   Total hip replacement is roughly an hour and a half to 2 performed under general anesthesia.  We did discuss different ways of doing the total hip.  I go through the back of the hip and that is what you had on the other side  Total hip is considered outpatient surgery by the government that means you have to go home he that a same-day or overnight stay.  With the your medical condition I think we should see if the insurance will approve you for inpatient surgery.  If not we will keep you overnight to see how you do the next day.  Depending how you do is depending what we can do our cells.  Either skilled nursing facility for a couple weeks or home with home health.  If you go home you get home health for 2 weeks and a visiting nurse to come change the dressing and check on you make sure you okay.  After 2 weeks you start outpatient physical therapy.  It will take roughly 3-6 months for complete healing to occur.  Procedure, common risks and benefits,alternatives discussed in details.All questions answered.Patient expressed understanding.Patient instructed to call for any questions that could arise in the future.    Most common Risks:  Infection/less than 2% chance  Leg-length discrepancy  Dislocation/2-3% chance of that happening  Neuro-vascular injury ( resulting in loss of motor and sensory functions)  Pain  Blood clot/you are taking Plavix right now after surgery we might have to put you  on Eliquis after surgery  Fat clot  Loss of motion/we heal with scar tissue your job is to do the exercise  Fracture of bone  Failure of procedure to achieve its intended purpose/total hip replacement is considered 90% successful in decreasing pain and increasing function  Failure of hardware/total hip replacement made a metal and plastic it should last on the average 15 years  Non-union or mal-union of bone  Malalignment  Death/we are waiting for Dr. Khanna to clear you for surgery    Patient instructions for joint replacement    Before surgery  1.  Shower with Hibiclens soap/antibacterial for 3 days prior to surgery to decrease risk of infection  2..  Stop all blood thinners/aspirin, Coumadin, warfarin, Plavix, Eliquis, Xarelto etc 5 days prior to surgery  3.  No eating or drinking after midnight the night before surgery.  We would like you to drink a bottle of Gatorade or Powerade or Pedialyte the night before surgery prior to midnight so you do not get dehydrated waiting to have surgery the next day  4.  Take Tylenol 650 mg the night prior to surgery    5. Must stop Mounjaro 7-10 days prior to surgery    After surgery at home  1.  Take Tylenol 650 mg 3 times a day for 14 days then as needed for mild pain  2.  Take gabapentin 300 mg nightly for 6 weeks  3.  Take all your home medications starting the next day   4.  Must take aspirin 81 mg twice a day for 6 weeks unless you are on other blood thinners/Plavix, Eliquis, Xarelto, Coumadin etc  5.  Must wear compressive stockings for 6 weeks minimum to decrease the risk of blood clot and swelling  6.  Hydrocodone/Norco or oxycodone/Percocet will be prescribed to take every 6 hr as needed for breakthrough pain  7.  May apply ice on the knee to help with decreasing pain  8.  Keep wound dry for 2 weeks until stitches/staples removed than you will be allowed to shower in 24 hr and get the wound wet.  No soaking of the wound in the tub or swimming for 4 weeks after  surgery  9.  No driving for 4 weeks unless specified by physician  10.  Avoid touching the wound or surrounding area /at least 2 inches on each side of the surgical incision until staples are removed/stitches   11.  May change the surgical dressing if extremely bloody or has drainage on it. May clean the wound with peroxide or Betadine and apply sterile dressing and Ace wrap over it  12.  Leave hospital dressing on for 3 days then may change by applying sterile 4 x 4 and Ace wrap after cleaning with Betadine or peroxide.  May leave this dressing change to home health nurses    Pain management will try to lower your dosage of roxycodone before surgery.  After surgery I will give you Percocets for 2 weeks on top of the Roxycodone .  After that your pain management doctor should be taking over.  You would need extra after surgery  Mandatory class before surgery        The mobility limitation can not be sufficiently resolved by the use of a cane.  Patient's functional mobility deficit can be sufficiently resolved with the use of a rolling walker.  Patient has mobility limitation significantly impairs their ability to participate in 1 or more activities of daily living.  The use of a rolling walker we will significantly improve the patient's ability to participate in  MRADLS and it is to be used on a regular basis in the home.                Disclaimer: This note was prepared using a voice recognition system and is likely to have sound alike errors within the text.

## 2025-01-06 NOTE — PATIENT INSTRUCTIONS
X-ray show severe arthritis of her right hip your left total hip is still doing really well  Your exam show severe limitation of motion on the right hip and pelvis that tilts with motion as far as the left hip is concerned is really moving well without pain  You been through pain management over the last several years.  You have rheumatoid arthritis you are on methotrexate and prednisone as needed.  You do have major cardiac issues in you seeing Dr. Khanna who did a stress test on you recently and he is probably would clear you for surgery.  You also taking her oxycodone because of the pain and you are on gabapentin which you take on occasions.  You have a Rollator at home.  You are losing weight now you are on Mounjaro on you did have weight loss surgery and you can not take anti-inflammatories because of the weight loss surgery  Total hip replacement on the right side is indicated   Total hip replacement is roughly an hour and a half to 2 performed under general anesthesia.  We did discuss different ways of doing the total hip.  I go through the back of the hip and that is what you had on the other side  Total hip is considered outpatient surgery by the government that means you have to go home he that a same-day or overnight stay.  With the your medical condition I think we should see if the insurance will approve you for inpatient surgery.  If not we will keep you overnight to see how you do the next day.  Depending how you do is depending what we can do our cells.  Either skilled nursing facility for a couple weeks or home with home health.  If you go home you get home health for 2 weeks and a visiting nurse to come change the dressing and check on you make sure you okay.  After 2 weeks you start outpatient physical therapy.  It will take roughly 3-6 months for complete healing to occur.  Procedure, common risks and benefits,alternatives discussed in details.All questions answered.Patient expressed  understanding.Patient instructed to call for any questions that could arise in the future.    Most common Risks:  Infection/less than 2% chance  Leg-length discrepancy  Dislocation/2-3% chance of that happening  Neuro-vascular injury ( resulting in loss of motor and sensory functions)  Pain  Blood clot/you are taking Plavix right now after surgery we might have to put you on Eliquis after surgery  Fat clot  Loss of motion/we heal with scar tissue your job is to do the exercise  Fracture of bone  Failure of procedure to achieve its intended purpose/total hip replacement is considered 90% successful in decreasing pain and increasing function  Failure of hardware/total hip replacement made a metal and plastic it should last on the average 15 years  Non-union or mal-union of bone  Malalignment  Death/we are waiting for Dr. Khanna to clear you for surgery    Patient instructions for joint replacement    Before surgery  1.  Shower with Hibiclens soap/antibacterial for 3 days prior to surgery to decrease risk of infection  2..  Stop all blood thinners/aspirin, Coumadin, warfarin, Plavix, Eliquis, Xarelto etc 5 days prior to surgery  3.  No eating or drinking after midnight the night before surgery.  We would like you to drink a bottle of Gatorade or Powerade or Pedialyte the night before surgery prior to midnight so you do not get dehydrated waiting to have surgery the next day  4.  Take Tylenol 650 mg the night prior to surgery    5. Must stop Mounjaro 7-10 days prior to surgery    After surgery at home  1.  Take Tylenol 650 mg 3 times a day for 14 days then as needed for mild pain  2.  Take gabapentin 300 mg nightly for 6 weeks  3.  Take all your home medications starting the next day   4.  Must take aspirin 81 mg twice a day for 6 weeks unless you are on other blood thinners/Plavix, Eliquis, Xarelto, Coumadin etc  5.  Must wear compressive stockings for 6 weeks minimum to decrease the risk of blood clot and swelling  6.   Hydrocodone/Norco or oxycodone/Percocet will be prescribed to take every 6 hr as needed for breakthrough pain  7.  May apply ice on the knee to help with decreasing pain  8.  Keep wound dry for 2 weeks until stitches/staples removed than you will be allowed to shower in 24 hr and get the wound wet.  No soaking of the wound in the tub or swimming for 4 weeks after surgery  9.  No driving for 4 weeks unless specified by physician  10.  Avoid touching the wound or surrounding area /at least 2 inches on each side of the surgical incision until staples are removed/stitches   11.  May change the surgical dressing if extremely bloody or has drainage on it. May clean the wound with peroxide or Betadine and apply sterile dressing and Ace wrap over it  12.  Leave hospital dressing on for 3 days then may change by applying sterile 4 x 4 and Ace wrap after cleaning with Betadine or peroxide.  May leave this dressing change to home health nurses    Pain management will try to lower your dosage of roxycodone before surgery.  After surgery I will give you Percocets for 2 weeks on top of the Roxycodone .  After that your pain management doctor should be taking over.  You would need extra after surgery  Mandatory class before surgery        The mobility limitation can not be sufficiently resolved by the use of a cane.  Patient's functional mobility deficit can be sufficiently resolved with the use of a rolling walker.  Patient has mobility limitation significantly impairs their ability to participate in 1 or more activities of daily living.  The use of a rolling walker we will significantly improve the patient's ability to participate in  MRADLS and it is to be used on a regular basis in the home.

## 2025-01-29 ENCOUNTER — TELEPHONE (OUTPATIENT)
Dept: ORTHOPEDICS | Facility: CLINIC | Age: 70
End: 2025-01-29
Payer: MEDICARE

## 2025-01-29 NOTE — TELEPHONE ENCOUNTER
----- Message from Eusebio sent at 1/29/2025 11:56 AM CST -----  Contact: [phone]  Type:  Sooner Apoointment Request    Caller is requesting a sooner appointment.  Caller declined first available appointment listed below.  Caller will not accept being placed on the waitlist and is requesting a message be sent to doctor.  Name of Caller:LEILANI MERCADO [[id number]]  When is the first available appointment?need to be seen sooner  Symptoms:medical clearance before her surgery   Would the patient rather a call back or a response via [facility]? Call back  Best Call Back Number: [phone]  Additional Information: [id number]

## 2025-01-29 NOTE — TELEPHONE ENCOUNTER
Returned the patient's phone call in regards to their message. Informed the patient that they are not schedule for surgery on 2/11/25 with Dr. Ramon. Informed the patient that we had a sticky not with their name on it to hold that spot but we do not have a cardiology clearance for them. Patient states that they did all of their cards testing on 1/17/25. Informed the patient that we have not received anything for them. Informed the patient to have their cardiologist to fax over their clearance to [phone]. Informed them that once we have the clearance from their cardiologist then we can get them schedule for surgery. Patient verbalized understanding.

## 2025-01-30 ENCOUNTER — TELEPHONE (OUTPATIENT)
Dept: ORTHOPEDICS | Facility: CLINIC | Age: 70
End: 2025-01-30
Payer: MEDICARE

## 2025-01-31 ENCOUNTER — HOSPITAL ENCOUNTER (OUTPATIENT)
Dept: CARDIOLOGY | Facility: HOSPITAL | Age: 70
Discharge: HOME | End: 2025-01-31
Attending: ANESTHESIOLOGY
Payer: MEDICARE

## 2025-01-31 ENCOUNTER — HOSPITAL ENCOUNTER (OUTPATIENT)
Dept: RADIOLOGY | Facility: HOSPITAL | Age: 70
Discharge: HOME | End: 2025-01-31
Attending: ANESTHESIOLOGY
Payer: MEDICARE

## 2025-01-31 ENCOUNTER — OFFICE VISIT (OUTPATIENT)
Dept: INTERNAL MEDICINE | Facility: CLINIC | Age: 70
End: 2025-01-31
Payer: MEDICARE

## 2025-01-31 DIAGNOSIS — E55.9 VITAMIN D DEFICIENCY: ICD-10-CM

## 2025-01-31 DIAGNOSIS — Z01.818 PREOPERATIVE EXAMINATION: ICD-10-CM

## 2025-01-31 DIAGNOSIS — Z01.818 PREOP TESTING: ICD-10-CM

## 2025-01-31 DIAGNOSIS — K21.9 GASTROESOPHAGEAL REFLUX DISEASE, UNSPECIFIED WHETHER ESOPHAGITIS PRESENT: ICD-10-CM

## 2025-01-31 DIAGNOSIS — E78.5 HYPERLIPIDEMIA, UNSPECIFIED HYPERLIPIDEMIA TYPE: ICD-10-CM

## 2025-01-31 DIAGNOSIS — F41.1 GENERALIZED ANXIETY DISORDER: ICD-10-CM

## 2025-01-31 DIAGNOSIS — G62.9 POLYNEUROPATHY: ICD-10-CM

## 2025-01-31 DIAGNOSIS — I50.9 CONGESTIVE HEART FAILURE, UNSPECIFIED HF CHRONICITY, UNSPECIFIED HEART FAILURE TYPE: ICD-10-CM

## 2025-01-31 DIAGNOSIS — Z79.4 TYPE 2 DIABETES MELLITUS WITH OTHER SPECIFIED COMPLICATION, WITH LONG-TERM CURRENT USE OF INSULIN: ICD-10-CM

## 2025-01-31 DIAGNOSIS — E11.69 TYPE 2 DIABETES MELLITUS WITH OTHER SPECIFIED COMPLICATION, WITH LONG-TERM CURRENT USE OF INSULIN: ICD-10-CM

## 2025-01-31 DIAGNOSIS — I25.10 CORONARY ARTERY DISEASE, UNSPECIFIED VESSEL OR LESION TYPE, UNSPECIFIED WHETHER ANGINA PRESENT, UNSPECIFIED WHETHER NATIVE OR TRANSPLANTED HEART: ICD-10-CM

## 2025-01-31 DIAGNOSIS — F32.1 CURRENT MODERATE EPISODE OF MAJOR DEPRESSIVE DISORDER WITHOUT PRIOR EPISODE: ICD-10-CM

## 2025-01-31 DIAGNOSIS — G89.4 CHRONIC PAIN SYNDROME: ICD-10-CM

## 2025-01-31 DIAGNOSIS — R11.2 PONV (POSTOPERATIVE NAUSEA AND VOMITING): ICD-10-CM

## 2025-01-31 DIAGNOSIS — E11.59 HYPERTENSION ASSOCIATED WITH DIABETES: ICD-10-CM

## 2025-01-31 DIAGNOSIS — M16.11 PRIMARY OSTEOARTHRITIS OF RIGHT HIP: Primary | ICD-10-CM

## 2025-01-31 DIAGNOSIS — T78.40XA ALLERGY, INITIAL ENCOUNTER: ICD-10-CM

## 2025-01-31 DIAGNOSIS — Z98.890 PONV (POSTOPERATIVE NAUSEA AND VOMITING): ICD-10-CM

## 2025-01-31 DIAGNOSIS — E66.01 MORBID OBESITY: ICD-10-CM

## 2025-01-31 DIAGNOSIS — I15.2 HYPERTENSION ASSOCIATED WITH DIABETES: ICD-10-CM

## 2025-01-31 DIAGNOSIS — M06.9 RHEUMATOID ARTHRITIS, INVOLVING UNSPECIFIED SITE, UNSPECIFIED WHETHER RHEUMATOID FACTOR PRESENT: ICD-10-CM

## 2025-01-31 LAB
OHS QRS DURATION: 72 MS
OHS QTC CALCULATION: 413 MS

## 2025-01-31 PROCEDURE — 93010 ELECTROCARDIOGRAM REPORT: CPT | Mod: ,,, | Performed by: INTERNAL MEDICINE

## 2025-01-31 PROCEDURE — 71045 X-RAY EXAM CHEST 1 VIEW: CPT | Mod: TC

## 2025-01-31 PROCEDURE — 71045 X-RAY EXAM CHEST 1 VIEW: CPT | Mod: 26,,, | Performed by: STUDENT IN AN ORGANIZED HEALTH CARE EDUCATION/TRAINING PROGRAM

## 2025-01-31 PROCEDURE — 99999 PR PBB SHADOW E&M-EST. PATIENT-LVL V: CPT | Mod: PBBFAC,,,

## 2025-01-31 PROCEDURE — 99215 OFFICE O/P EST HI 40 MIN: CPT | Mod: PBBFAC,25

## 2025-01-31 PROCEDURE — 93005 ELECTROCARDIOGRAM TRACING: CPT

## 2025-01-31 NOTE — DISCHARGE INSTRUCTIONS
Pre op instructions reviewed with patient face to face during Clinic Visit with Provider, verbalized understanding.    To confirm, Surgery is scheduled on 2/11/25. We will call you after 2pm the day before Surgery with your arrival time.    *Please report to the [facility] (1st Floor) located off of [facility] (2nd Entrance/Building on the left, in front of the flag pole).  Address: [address] [place] [address] [place]. [id number]    Your Type & Screen appointment is scheduled on 2/10/25 @ [facility] ([facility] Location). Please DO NOT remove the red arm band applied.      !!!INSTRUCTIONS IMPORTANT!!!  DO NOT Eat, Drink, or Smoke after 12 midnight unless instructed otherwise by your Surgeon. OK to brush teeth, no gum, candy or mints!    MORNING OF SURGERY, drink small sip of water with the following medications instructed by Pre-Admit Provider:  Dexilant and Lopressor     *Additional Surgeon Instructions: Take Tylenol 650 mg and drink a bottle of Gatorade the night prior to surgery! BRING WALKER to Hospital if received prior to surgery!    *Blood Thinners: - Hold PLAVIX 5 days prior to Surgery.    - PLEASE DO NOT TAKE AN EVENING DOSE OF INSULIN THE NIGHT BEFORE SURGERY  - PLEASE DO NOT TAKE A DOSE OF MOUNJARO AFTER 2/03/25  - PLEASE HOLD METHOTREXATE 7 DAYS BEFORE SURGERY    Diabetic/Prediabetic Patients: If you take diabetic or weight loss medication, Do NOT take morning of surgery unless instructed by Doctor. Metformin to be stopped 24 hrs prior to surgery.  DO NOT take long-acting insulin the evening before surgery. Blood sugars will be checked in pre-op by Nurse.    If your are taking Ozempic/ Mounjaro/ Wegovy/ Trulicity/ Semaglutide injections or weight loss medication, such of Phentermine, please notify us immediately as they must be stopped 7 days prior to surgery.    !!!Patients should HOLD all vitamins, herbal supplements,aspirins, NSAIDS & weight loss medications 7 days prior to surgery!!! Ok  to take Tylenol:)    ____  Avoid Alcoholic beverages 3 days prior to surgery, as it can thin the blood.  ____  NO Acrylic/fake nails or nail polish worn day of surgery (specifically hand/arm & foot surgeries).  ____  NO powder, lotions, deodorants, oils or cream on body.  ____  Remove all jewelry, piercings, & foreign objects prior to arrival and leave at home.  ____  Remove Dentures, Hearing Aids & Contact Lens prior to surgery.  ____  Bring photo ID and insurance information to hospital (Leave Valuables at Home).  ____  If going home the same day, arrange for a ride home. You will not be able to drive for 24 hrs if Anesthesia was used.   ____  Females (ages 11-60): may need to give a urine sample the morning of surgery; please see Pre op Nurse prior to using the restroom.  ____  Males: Stop ED medications (Viagra, Cialis) 24 hrs prior to surgery.  ____  Wear clean, loose fitting clothing to allow for dressings/ bandages.      Bathing Instructions:       -Do not shave your body 3 days before the day of surgery.              -Shower with Dial / Hibiclens soap/antibacterial for 3 days prior to surgery to decrease risk of infection             -Shower & Rinse your body as usual with anti-bacterial Soap, (Dial), for three days before surgery                - on the evening prior to surgery and the morning of surgery, apply one packet of Hibiclens soap to the surgical site.               -Wash the site gently for 5 full minutes. Do Not scrub your skin too hard.               -Rinse your body thoroughly.                -Pat yourself day with a clean, soft towel.               -Do not use lotion, cream, powder or deodorant               -Dress in clean clothes      [facility]:  Only 2 adults allowed in pre op/recovery area during your procedure. You MUST HAVE A RIDE HOME from a responsible adult that you know and trust. Medical Transport, Uber or Lyft can ONLY be used if patient has a responsible adult to  accompany them during ride home.        *Signs and symptoms of Infection Before or After Surgery:               !!!If you experience any fever, chills, nausea/ vomiting, foul odor/ excessive drainage from surgical site, flu-like symptoms, new wounds or cuts, PLEASE CALL THE SURGEON OFFICE at [phone] or SEND MESSAGE THROUGH [employer]!!!       *If you are running late day of surgery, please call the Surgery Dept @ [phone].    *Billing question, please call:  [phone] or [phone]       Thank you,  -[facility] Pre Admit Dept.  [phone] - [place]   or [phone]  M-F 7:30 am-4:00 pm (Closed Major Holidays)    Additional Tests Scheduled Today:  LABS / URINE TEST (1ST Floor) Check in at the !

## 2025-01-31 NOTE — ASSESSMENT & PLAN NOTE
ARTHROPLASTY, HIP (RIGHT) planned per Dr. Ramon on 2/11/2025     Known risk factors for perioperative complications: Congestive heart failure  Diabetes mellitus    GERD  CAD- CABG 8/6/2023  Difficulty with intubation  MAYBE  anticipated. MP Score IV.     Cardiac Risk Estimation: Based on the Revised Cardiac Risk index, patient is a Class III risk with a 10.1 % risk of a major cardiac event in a low risk procedure.    Cardiac evaluation:  Patient has been cleared for surgery from a cardiac standpoint in deemed a low risk.  Patient is cleared to hold blood thinner for 5-7 days prior to procedure per Cardiology encounter-noted 01/31/2025 in media    1.) Preoperative workup as follows: chest x-ray, ECG, hemoglobin, hematocrit, electrolytes, creatinine, glucose, urinalysis (urinary tract instrumentation planned).  2.) Change in medication regimen before surgery: discontinue ASA or Plavix 5 days before surgery, discontinue NSAIDs 5 days before surgery, hold Metformin 24 hours prior to surgery. Hold all vitamins/supplements for 7 days prior to surgery, with the exception of Potassium and Iron supplementation. Hold semaglutide/tirzepatide for 7 days prior to surgery.   3.) Prophylaxis for cardiac events with perioperative beta-blockers: Metoprolol continued.  4.) Invasive hemodynamic monitoring perioperatively: at the discretion of anesthesiologist.  5.) Deep vein thrombosis prophylaxis postoperatively: regimen to be chosen by surgical team.  6.) Surveillance for postoperative MI with ECG immediately postoperatively and on postoperati ve days 1 and 2 AND troponin levels 24 hours postoperatively and on day 4 or hospital discharge (whichever comes first): at the discretion of anesthesiologist.  7.) Current medications which may produce withdrawal symptoms if withheld perioperatively: Roxicodone  8.) Other measures: Careful attention to perioperative glycemic control (POCT glucose monitoring).  Postoperative hypertension  management with IV hydralazine until able to take oral medications.  Postoperative incentive spirometry to prevent pneumonia. C consult for management of co-morbidities post procedure. IV antiemetics as needed for nausea/vomiting symptom control post procedure.

## 2025-01-31 NOTE — PRE ADMISSION SCREENING
Pre op instructions reviewed with patient face to face during Clinic Visit with Provider, verbalized understanding.    To confirm, Surgery is scheduled on 2/11/25. We will call you after 2pm the day before Surgery with your arrival time.    *Please report to the [facility] (1st Floor) located off of [facility] (2nd Entrance/Building on the left, in front of the flag pole).  Address: [address] [place] [address] [place]. [address]    Your Type & Screen appointment is scheduled on 2/10/25 @ [facility] ([facility] Location). Please DO NOT remove the red arm band applied.      !!!INSTRUCTIONS IMPORTANT!!!  DO NOT Eat, Drink, or Smoke after 12 midnight unless instructed otherwise by your Surgeon. OK to brush teeth, no gum, candy or mints!    MORNING OF SURGERY, drink small sip of water with the following medications instructed by Pre-Admit Provider:  Dexilant and Lopressor     *Additional Surgeon Instructions: Take Tylenol 650 mg and drink a bottle of Gatorade the night prior to surgery! BRING WALKER to Hospital if received prior to surgery!    *Blood Thinners: - Hold PLAVIX 5 days prior to Surgery.    - PLEASE DO NOT TAKE AN EVENING DOSE OF INSULIN THE NIGHT BEFORE SURGERY  - PLEASE DO NOT TAKE A DOSE OF MOUNJARO AFTER 2/03/25  - PLEASE HOLD METHOTREXATE 7 DAYS BEFORE SURGERY    Diabetic/Prediabetic Patients: If you take diabetic or weight loss medication, Do NOT take morning of surgery unless instructed by Doctor. Metformin to be stopped 24 hrs prior to surgery.  DO NOT take long-acting insulin the evening before surgery. Blood sugars will be checked in pre-op by Nurse.    If your are taking Ozempic/ Mounjaro/ Wegovy/ Trulicity/ Semaglutide injections or weight loss medication, such of Phentermine, please notify us immediately as they must be stopped 7 days prior to surgery.    !!!Patients should HOLD all vitamins, herbal supplements,aspirins, NSAIDS & weight loss medications 7 days prior to surgery!!! Ok  to take Tylenol:)    ____  Avoid Alcoholic beverages 3 days prior to surgery, as it can thin the blood.  ____  NO Acrylic/fake nails or nail polish worn day of surgery (specifically hand/arm & foot surgeries).  ____  NO powder, lotions, deodorants, oils or cream on body.  ____  Remove all jewelry, piercings, & foreign objects prior to arrival and leave at home.  ____  Remove Dentures, Hearing Aids & Contact Lens prior to surgery.  ____  Bring photo ID and insurance information to hospital (Leave Valuables at Home).  ____  If going home the same day, arrange for a ride home. You will not be able to drive for 24 hrs if Anesthesia was used.   ____  Females (ages 11-60): may need to give a urine sample the morning of surgery; please see Pre op Nurse prior to using the restroom.  ____  Males: Stop ED medications (Viagra, Cialis) 24 hrs prior to surgery.  ____  Wear clean, loose fitting clothing to allow for dressings/ bandages.      Bathing Instructions:       -Do not shave your body 3 days before the day of surgery.              -Shower with Dial / Hibiclens soap/antibacterial for 3 days prior to surgery to decrease risk of infection             -Shower & Rinse your body as usual with anti-bacterial Soap, (Dial), for three days before surgery                - on the evening prior to surgery and the morning of surgery, apply one packet of Hibiclens soap to the surgical site.               -Wash the site gently for 5 full minutes. Do Not scrub your skin too hard.               -Rinse your body thoroughly.                -Pat yourself day with a clean, soft towel.               -Do not use lotion, cream, powder or deodorant               -Dress in clean clothes      [facility]:  Only 2 adults allowed in pre op/recovery area during your procedure. You MUST HAVE A RIDE HOME from a responsible adult that you know and trust. Medical Transport, Uber or Lyft can ONLY be used if patient has a responsible adult to  accompany them during ride home.        *Signs and symptoms of Infection Before or After Surgery:               !!!If you experience any fever, chills, nausea/ vomiting, foul odor/ excessive drainage from surgical site, flu-like symptoms, new wounds or cuts, PLEASE CALL THE SURGEON OFFICE at [phone] or SEND MESSAGE THROUGH [employer]!!!       *If you are running late day of surgery, please call the Surgery Dept @ [phone].    *Billing question, please call:  [phone] or [phone]       Thank you,  -[facility] Pre [facility].  [phone] - [place]   or [phone]  M-F 7:30 am-4:00 pm (Closed Major Holidays)    Additional Tests Scheduled Today:  LABS / URINE TEST (1ST Floor) Check in at the !

## 2025-01-31 NOTE — PROGRESS NOTES
Preoperative History and Physical  Pre-Admit Testing                                                                    Chief Complaint: Preoperative evaluation     History of Present Illness:      Sandra Sanders is a 69 y.o. female who presents to the office today for a preoperative consultation at the request of Dr. Ramon who plans on performing a ARTHROPLASTY, HIP (RIGHT) on February 11.     Functional Status:      The patient is not able to climb a flight of stairs. The patient is able to ambulate with walker required for gait assistance. The patient's functional status is affected by the surgical problem due to pain. The patient's functional status is not affected by shortness of breath, chest pain, dyspnea on exertion and fatigue.    MET score greater than 4    Patient Anesthesia History:      History of Malignant Hyperthermia: no  History of Pseudocholinesterase Deficiency: no  History of PONV: yes  History of difficult intubation: no  History of delayed emergence: no  History of high fever after anesthesia: no    Family Anesthesia History:      History of Malignant Hyperthermia: no  History of Pseudocholinesterase Deficiency: no    Past Medical History:      Past Medical History:   Diagnosis Date    Anxiety     Asthma     CHF (congestive heart failure)     Diabetes mellitus, type 2     GERD (gastroesophageal reflux disease)     Hyperlipidemia         Past Surgical History:      Past Surgical History:   Procedure Laterality Date    BYPASS GRAFT  08/06/2013    CHOLECYSTECTOMY  2023    CYST REMOVAL Right 1995    HIP SURGERY Left     2009    HYSTERECTOMY  2015    LAPAROSCOPIC SLEEVE GASTRECTOMY  2020    LEFT HEART CATHETERIZATION  01/17/2025    MULTIPLE TOOTH EXTRACTIONS  03/2015    TUBAL LIGATION Bilateral 1982    [facility]        Social History:      Social History     Socioeconomic History    Marital status:    Tobacco Use    Smoking  status: Former    Smokeless tobacco: Never   Substance and Sexual Activity    Alcohol use: Not Currently    Drug use: Never     Social Drivers of Health     Transportation Needs: Unknown (4/15/2022)    Received from [facility]    [facility]     Lack of Transportation (Non-Medical): No        Family History:      No family history on file.    Allergies:      Review of patient's allergies indicates:   Allergen Reactions    Insulin lispro     Insulin regular human     Metformin     Nsaids (non-steroidal anti-inflammatory drug)     Statins-hmg-coa reductase inhibitors        Medications:      Current Outpatient Medications   Medication Sig    ALPRAZolam (XANAX) 0.5 MG tablet SMARTSI Tablet(s) By Mouth Morning-Evening    ALPRAZolam (XANAX) 1 MG tablet     blood sugar diagnostic (TRUE METRIX GLUCOSE TEST STRIP) Strp USE AS DIRECTED FOR GLUCOSE MONITORING THREE TIMES DAILY    blood-glucose meter (TRUE METRIX GLUCOSE METER) Misc USE AS DIRECTED TO CHECK GLUCOSE TESTING THREE TIMES DAILY    busPIRone (BUSPAR) 7.5 MG tablet Take 7.5 mg by mouth once daily.    cetirizine (ZYRTEC) 10 MG tablet Take 1 tablet by mouth once daily.    cholecalciferol, vitamin D3, (DECARA) 50,000 unit capsule     clopidogreL (PLAVIX) 75 mg tablet Take 75 mg by mouth once daily.    dexlansoprazole (DEXILANT) 60 mg capsule Take 60 mg by mouth once daily.    dicyclomine (BENTYL) 20 mg tablet Take 20 mg by mouth as needed.    docusate sodium (COLACE) 100 MG capsule Take 1 capsule by mouth 2 (two) times daily as needed.    fluconazole (DIFLUCAN) 150 MG Tab Take 150 mg by mouth as needed.    fluticasone (FLONASE) 50 mcg/actuation nasal spray     folic acid (FOLVITE) 1 MG tablet Take 1 tablet by mouth every morning.    furosemide (LASIX) 40 MG tablet Take 1 tablet by mouth 2 (two) times daily.    gabapentin (NEURONTIN) 100 MG capsule Take 1 capsule by mouth 2 (two)  times daily.    guaiFENesin (MUCINEX) 600 mg 12 hr tablet Take 600 mg by mouth as needed.    insulin aspart U-100 (NOVOLOG FLEXPEN U-100 INSULIN) 100 unit/mL InPn pen     insulin detemir U-100 (LEVEMIR FLEXTOUCH U-100 INSULN) 100 unit/mL (3 mL) SubQ InPn pen     Lactobac. rhamnosus GG-inulin 10 billion cell -200 mg CpSP Take by mouth once daily.    levocetirizine (XYZAL) 5 MG tablet     LIVALO 2 mg Tab tablet     methotrexate 2.5 MG Tab Take 8 tablets by mouth once a week.    metoprolol tartrate (LOPRESSOR) 50 MG tablet     MOUNJARO 7.5 mg/0.5 mL PnIj INJECT 7.5MG UNDER THE SKIN EVERY 7 DAYS    MOVANTIK 12.5 mg Tab Take 1 tablet by mouth every morning.    mupirocin (BACTROBAN) 2 % ointment Apply topically 3 (three) times daily.    nitrofurantoin, macrocrystal-monohydrate, (MACROBID) 100 MG capsule Take 100 mg by mouth 2 (two) times daily.    nitroGLYCERIN (NITROSTAT) 0.4 MG SL tablet Place 0.4 mg under the tongue.    ondansetron (ZOFRAN-ODT) 8 MG TbDL Take 8 mg by mouth as needed.    phenoL-glycerin (CHLORASEPTIC MAX SORE THROAT) 1.5-33 % [place] SPRAY  .    predniSONE (DELTASONE) 5 MG tablet Take 5 mg by mouth as needed.    promethazine (PHENERGAN) 25 MG tablet     QVAR 40 mcg/actuation Aero INHALE 2 PUFFS BY MOUTH TWICE DAILY    ROXICODONE 30 mg Tab     TRINTELLIX 10 mg Tab Take 1 tablet by mouth once daily.     No current facility-administered medications for this visit.       Vitals:      Vitals:    01/31/25 1325   BP: 110/74   Pulse: 85   Temp: 97.9 °F (36.6 °C)       Review of Systems:        Constitutional: Negative for fever, chills, weight loss, malaise/fatigue and diaphoresis.   HENT: Negative for hearing loss, ear pain, nosebleeds, congestion, sore throat, neck pain, tinnitus and ear discharge.    Eyes: Negative for blurred vision, double vision, photophobia, pain, discharge and redness.   Respiratory: Negative for cough, hemoptysis, sputum production, shortness of breath, wheezing and stridor.     Cardiovascular: Negative for chest pain, palpitations, orthopnea, claudication, leg swelling and PND.   Gastrointestinal: Negative for heartburn, vomiting, abdominal pain, diarrhea, constipation, blood in stool and melena. + nausea  Genitourinary: Negative for dysuria, urgency, frequency, hematuria and flank pain.   Musculoskeletal: Negative for myalgias, back pain, and falls. + R hip pain   Skin: Negative for itching and rash.   Neurological: Negative for dizziness, tingling, tremors, sensory change, speech change, focal weakness, seizures, loss of consciousness, weakness and headaches.   Endo/Heme/Allergies: Negative for environmental allergies and polydipsia. Does not bruise/bleed easily.   Psychiatric/Behavioral: Negative for depression, suicidal ideas, hallucinations, memory loss and substance abuse. The patient is not nervous/anxious and does not have insomnia.    All 14 systems reviewed and negative except as noted above.    Physical Exam:      Constitutional: Appears well-developed with large body habitus, well-nourished and in no acute distress.  Patient is oriented to person, place, and time. Patient in wheelchair during exam   Head: Normocephalic and atraumatic. Mucous membranes moist.  Neck: Neck supple no mass.   Cardiovascular: Normal rate and regular rhythm.  S1 S2 appreciated by ascultation.  Pulmonary/Chest: Effort normal and clear to auscultation bilaterally. No respiratory distress.   Abdomen: Soft. Non-tender and non-distended. Bowel sounds are normal.   Neurological: Patient is alert and oriented to person, place and time. Moves all extremities.  Skin: Warm and dry. No lesions.  Extremities: No clubbing, cyanosis or edema. Limited ROM to RLE due to R hip pain     Laboratory data:      Reviewed and noted in plan where applicable. Please see chart for full laboratory data.    Lab Results   Component Value Date    INR 1.2 12/31/2009    INR 1.9 (H) 12/24/2009    INR 2.0 (H) 12/21/2009       Lab  Results   Component Value Date    WBC 7.76 01/31/2025    HGB 13.0 01/31/2025    HCT 42.7 01/31/2025    MCV 91 01/31/2025     01/31/2025     Comprehensive Metabolic Panel  Order: [id number]  Status: Final result  Dx: Preop testing              Component Ref Range & Units 5 d ago  (1/31/25) 2 yr ago  (4/16/22) 2 yr ago  (4/15/22) 14 yr ago  (8/23/10) 14 yr ago  (4/13/10) 15 yr ago  (12/31/09) 15 yr ago  (12/24/09)   Sodium 136 - 145 mmol/L 138 139 140 135 Low  R 137 R 137 R 139 R   Potassium 3.5 - 5.1 mmol/L 4.4   4.1 R 4.1 R 4.0 R 4.7 R, CM   Chloride 95 - 110 mmol/L 100 100 R 100 R 98 R 101 R 98 R 102 R   CO2 23 - 29 mmol/L 29 32 R 31 R 30.3 High  R 26 R 30 High  R 27 R   Glucose 70 - 110 mg/dL 120 High    156 High  R 103 R 99 R 121 High  R   BUN 8 - 23 mg/dL 10 15 R 16 R 11 R 12 R 13 R 7 R   Creatinine 0.5 - 1.4 mg/dL 0.8 0.79 R 0.75 R 0.9 R 0.7 R 0.7 R 0.7 R   Calcium 8.7 - 10.5 mg/dL 9.8 8.6 Low  R 8.8 R 9.5 R 9.5 R 9.7 R 9.5 R   Total Protein 6.0 - 8.4 g/dL 7.7   8.3 R 7.4 R  7.3 R   Albumin 3.5 - 5.2 g/dL 3.4 Low  3.1 Low  R  3.2 Low  R 3.1 Low  R  3.0 Low  R   Total Bilirubin 0.1 - 1.0 mg/dL 0.5   0.2 R, CM 0.3 R, CM  0.6 R, CM   Comment: For infants and newborns, interpretation of results should be based  on gestational age, weight and in agreement with clinical  observations.    Premature Infant recommended reference ranges:  Up to 24 hours.............<8.0 mg/dL  Up to 48 hours............<12.0 mg/dL  3-5 days..................<15.0 mg/dL  6-29 days.................<15.0 mg/dL   Alkaline Phosphatase 40 - 150 U/L 74   126 R 92 R  97 R   AST 10 - 40 U/L 25   20 16  24   ALT 10 - 44 U/L 22   35 11  17   eGFR >60 mL/min/1.73 m^2 >60         Anion Gap 8 - 16 mmol/L 9 7 Low  9 12 R 15 R 15 R 16 R   Resulting Agency  [facility] LISYOLANDA PARRISH             Specimen Collected: 01/31/25 14:38 CST Last Resulted: 01/31/25 15:51 CST       Predictors of  intubation difficulty:       Morbid obesity? yes - BMI 46.04   Anatomically abnormal facies? no   Prominent incisors? no   Receding mandible? no   Short, thick neck? no   Neck range of motion: normal   Dentition:  Dentures: top and bottom full plate   Based on the Modified Mallampati, patient is a mallampati score: IV (only hard palate visible)    Cardiographics:      ECG: normal sinus rhythm  Echocardiogram on 10/15/2018 1.  Normal LV size and systolic function.  Mild concentric LVH noted.    LVEF fraction 60%.   2.  Biatrial enlargement noted.   3.  Right ventricle at upper limit of normal.   4.  Calcified mitral annulus without any stenosis.   5.  Color Doppler and Doppler flow study indicates trace tricuspid   regurgitation.   6. Pulmonic valve is not well-visualized.   7.  Aortic valve appears to be normal.   8.  No pericardial effusion noted.     Imaging:      Chest x-ray: Left lung field partially obscured by cardiac silhouette.  No definitive evidence of acute confluent infiltrates, pleural effusions, or pneumothorax. No evidence of acute osseous abnormality per imaging on 01/31/2025    Assessment and Plan:      1. Primary osteoarthritis of right hip  Assessment & Plan:  ARTHROPLASTY, HIP (RIGHT) planned per Dr. Ramon on 2/11/2025     Known risk factors for perioperative complications: Congestive heart failure  Diabetes mellitus    GERD  Difficulty with intubation  MAYBE  anticipated. MP Score IV.     Cardiac Risk Estimation: Based on the Revised Cardiac Risk index, patient is a Class III risk with a 10.1 % risk of a major cardiac event in a low risk procedure.    Cardiac evaluation:  Patient has been cleared for surgery from a cardiac standpoint in deemed a low risk.  Patient is cleared to hold blood thinner for 5-7 days prior to procedure per Cardiology encounter-noted 01/31/2025 in media    1.) Preoperative workup as follows: chest x-ray, ECG, hemoglobin, hematocrit, electrolytes, creatinine, glucose,  urinalysis (urinary tract instrumentation planned).  2.) Change in medication regimen before surgery: discontinue ASA/Plavix 5 days before surgery, discontinue NSAIDs 5 days before surgery, hold Metformin 24 hours prior to surgery. Hold all vitamins/supplements for 7 days prior to surgery, with the exception of Potassium and Iron supplementation. Hold semaglutide/tirzepatide for 7 days prior to surgery.   3.) Prophylaxis for cardiac events with perioperative beta-blockers: Metoprolol continued.  4.) Invasive hemodynamic monitoring perioperatively: at the discretion of anesthesiologist.  5.) Deep vein thrombosis prophylaxis postoperatively: regimen to be chosen by surgical team.  6.) Surveillance for postoperative MI with ECG immediately postoperatively and on postoperati ve days 1 and 2 AND troponin levels 24 hours postoperatively and on day 4 or hospital discharge (whichever comes first): at the discretion of anesthesiologist.  7.) Current medications which may produce withdrawal symptoms if withheld perioperatively: Roxicodone  8.) Other measures: Careful attention to perioperative glycemic control (POCT glucose monitoring).  Postoperative hypertension management with IV hydralazine until able to take oral medications.  Postoperative incentive spirometry to prevent pneumonia.  [facility] consult for management of co-morbidities post procedure. IV antiemetics as needed for nausea/vomiting symptom control post procedure.   -Urinalysis reviewed with no evidence of infectious process noted      2. Preoperative examination  -     Ambulatory referral/consult to Pre-Admit    3. Type 2 diabetes mellitus with other specified complication, with long-term current use of insulin  Assessment & Plan:  -Insulin w/ meals held during NPO period  -Mounjaro prescribed - last dose on 2/1/2025   -patient advised to hold Mounjaro for 7 days prior to procedure       4. Vitamin D deficiency  Assessment & Plan:  -vitamin-D supplements  prescribed  -patient advised to hold all vitamins and supplements for 7 days prior to procedure      5. Rheumatoid arthritis, involving unspecified site, unspecified whether rheumatoid factor present  Assessment & Plan:  -stable on prescribed medication regime  -methotrexate prescribed with last dose reported on 01/30/2025  -patient to hold methotrexate 2 weeks prior to procedure in 1 week post procedure  -prednisone prescribed as needed for acute flare-patient not taking at this time  -followed outpatient by Rheumatology      6. Gastroesophageal reflux disease, unspecified whether esophagitis present  Assessment & Plan:  -dexlansoprazole continued- take morning of surgery      7. Allergy, initial encounter  Assessment & Plan:  -stable  -Zyrtec and Flonase continued as needed      8. Congestive heart failure, unspecified HF chronicity, unspecified heart failure type  Assessment & Plan:  -most recent echocardiogram results reviewed  -Lasix continued- hold morning of surgery  -metoprolol continued-take morning of surgery  -followed outpatient by Cardiology      9. Hyperlipidemia, unspecified hyperlipidemia type  Assessment & Plan:  Stable  -Livalo continued nightly      10. Polyneuropathy  Assessment & Plan:  -in the setting of diabetes mellitus  -Neurontin prescribed- patient states she is currently not taking      11. Hypertension associated with diabetes  Assessment & Plan:  -controlled  -metoprolol continued- take morning of surgery  -Lasix continued- hold morning of surgery      12. Coronary artery disease, unspecified vessel or lesion type, unspecified whether angina present, unspecified whether native or transplanted heart  Assessment & Plan:  -patient reports history of CABG on 08/06/2023  -most recent [facility] reported on 01/17/2025  -Plavix prescribed- we will hold 5 days prior to procedure per recommendation  -metoprolol continued-take morning of surgery  -Lasix continued-hold morning of surgery  -Livalo  continued nightly  -nitroglycerin SL continued as needed  -followed outpatient by Cardiology with clearance obtained prior to procedure      13. Morbid obesity  Assessment & Plan:  BMI 46.04      14. Generalized anxiety disorder  Assessment & Plan:  stable  -Xanax continued as needed-patient takes nightly  -BuSpar continued- patient reports taking in the afternoon      15. Chronic pain syndrome  Assessment & Plan:  -oxycodone continued as needed- hold morning of surgery       16. Current moderate episode of major depressive disorder without prior episode   -stable with no acute symptoms reported  -Trintellix prescribed- last dose reported in December 2024    17. PONV  -IV antiemetics as needed for nausea/vomiting symptom control post procedure    Electronically signed by Nidhi Bishop NP on 2/5/2025 at 1:47 PM.

## 2025-02-05 VITALS
BODY MASS INDEX: 46.06 KG/M2 | HEART RATE: 85 BPM | WEIGHT: 259.94 LBS | SYSTOLIC BLOOD PRESSURE: 110 MMHG | OXYGEN SATURATION: 96 % | HEIGHT: 63 IN | DIASTOLIC BLOOD PRESSURE: 74 MMHG | TEMPERATURE: 98 F

## 2025-02-05 PROBLEM — F41.9 ANXIETY: Status: RESOLVED | Noted: 2025-02-05 | Resolved: 2025-02-05

## 2025-02-05 PROBLEM — T78.40XA ALLERGIES: Status: ACTIVE | Noted: 2025-02-05

## 2025-02-05 PROBLEM — E55.9 VITAMIN D DEFICIENCY: Status: ACTIVE | Noted: 2025-02-05

## 2025-02-05 PROBLEM — F41.9 ANXIETY: Status: ACTIVE | Noted: 2025-02-05

## 2025-02-05 PROBLEM — Z98.890 PONV (POSTOPERATIVE NAUSEA AND VOMITING): Status: ACTIVE | Noted: 2025-02-05

## 2025-02-05 PROBLEM — R11.2 PONV (POSTOPERATIVE NAUSEA AND VOMITING): Status: ACTIVE | Noted: 2025-02-05

## 2025-02-05 NOTE — ASSESSMENT & PLAN NOTE
stable  -Xanax continued as needed-patient takes nightly  -BuSpar continued- patient reports taking in the afternoon

## 2025-02-05 NOTE — ASSESSMENT & PLAN NOTE
-most recent echocardiogram results reviewed  -Lasix continued- hold morning of surgery  -metoprolol continued-take morning of surgery  -followed outpatient by Cardiology

## 2025-02-05 NOTE — ASSESSMENT & PLAN NOTE
-stable on prescribed medication regime  -methotrexate prescribed with last dose reported on 01/30/2025  -patient to hold methotrexate 2 weeks prior to procedure in 1 week post procedure  -prednisone prescribed as needed for acute flare-patient not taking at this time  -followed outpatient by Rheumatology

## 2025-02-05 NOTE — ASSESSMENT & PLAN NOTE
-vitamin-D supplements prescribed  -patient advised to hold all vitamins and supplements for 7 days prior to procedure

## 2025-02-05 NOTE — ASSESSMENT & PLAN NOTE
-Insulin w/ meals held during NPO period  -Mounjaro prescribed - last dose on 2/1/2025   -patient advised to hold Mounjaro for 7 days prior to procedure

## 2025-02-05 NOTE — ASSESSMENT & PLAN NOTE
-patient reports history of CABG on 08/06/2023  -most recent [facility] reported on 01/17/2025  -Plavix prescribed- we will hold 5 days prior to procedure per recommendation  -metoprolol continued-take morning of surgery  -Lasix continued-hold morning of surgery  -Livalo continued nightly  -nitroglycerin SL continued as needed  -followed outpatient by Cardiology with clearance obtained prior to procedure

## 2025-02-05 NOTE — ASSESSMENT & PLAN NOTE
-controlled  -metoprolol continued- take morning of surgery  -Lasix continued- hold morning of surgery

## 2025-02-05 NOTE — ASSESSMENT & PLAN NOTE
-in the setting of diabetes mellitus  -Neurontin prescribed- patient states she is currently not taking

## 2025-02-06 NOTE — ASSESSMENT & PLAN NOTE
-stable with no acute symptoms reported  -Trintellix prescribed- last dose reported in December 2024

## 2025-02-07 ENCOUNTER — TELEPHONE (OUTPATIENT)
Dept: ORTHOPEDICS | Facility: CLINIC | Age: 70
End: 2025-02-07
Payer: MEDICARE

## 2025-02-07 NOTE — TELEPHONE ENCOUNTER
----- Message from MAK Hodge sent at 2/7/2025 12:02 PM CST -----  Regarding: sx questions  Karin Bae, I just talked to this pt who states she lives alone and does not have anyone to help her after surgery. Please call her on her cell. She is very concerned about being alone after sx.    Thanks

## 2025-02-07 NOTE — PRE-PROCEDURE INSTRUCTIONS
Returned pt's r/t to medication and surgery questions. I was able to answer her questions. She verbalized understanding and was grateful for the call. She had questions r/t to going to rehab after surgery. I sent an In Basket message to Dr. Ramon's staff and asked them to reach out to the pt.

## 2025-02-10 ENCOUNTER — TELEPHONE (OUTPATIENT)
Dept: SURGERY | Facility: CLINIC | Age: 70
End: 2025-02-10
Payer: MEDICARE

## 2025-02-10 ENCOUNTER — LAB VISIT (OUTPATIENT)
Dept: LAB | Facility: HOSPITAL | Age: 70
End: 2025-02-10
Attending: ORTHOPAEDIC SURGERY
Payer: MEDICARE

## 2025-02-10 ENCOUNTER — TELEPHONE (OUTPATIENT)
Dept: ORTHOPEDICS | Facility: CLINIC | Age: 70
End: 2025-02-10
Payer: MEDICARE

## 2025-02-10 DIAGNOSIS — Z01.818 PREOP TESTING: ICD-10-CM

## 2025-02-10 LAB
ABO + RH BLD: NORMAL
BLD GP AB SCN CELLS X3 SERPL QL: NORMAL
SPECIMEN OUTDATE: NORMAL

## 2025-02-10 PROCEDURE — 36415 COLL VENOUS BLD VENIPUNCTURE: CPT | Performed by: ORTHOPAEDIC SURGERY

## 2025-02-10 PROCEDURE — 86901 BLOOD TYPING SEROLOGIC RH(D): CPT | Performed by: ORTHOPAEDIC SURGERY

## 2025-02-10 NOTE — TELEPHONE ENCOUNTER
----- Message from Estrella sent at 2/10/2025  2:18 PM CST -----  Contact: Sandra  Type:  Patient Requesting a call back     Who Called:Sandra   What is the call back request regarding?:pt needs to know the time of appt and more information   Would the patient rather a call back or a response via [facility]?call   Best Call Back Number: [phone]    Additional Information:

## 2025-02-10 NOTE — TELEPHONE ENCOUNTER
Called and spoke with patient about the following:     Please arrive to [facility] at 8:15am on 2/11/25 for your scheduled procedure.  Address: [address] [place] [address] [place] [address] ([address] Building on left, 1st Floor Lobby)    !!!NO FOOD after midnight! You may have clear liquids up to 3 hrs before your arrival to the Hospital!!!  Clear liquids include Gatorade, water, soda, black coffee or tea (no milk or creamer), and clear juices.  Clear liquids do NOT include anything with pulp or food particles (Chicken broth, ice cream, yogurt, Jello, etc.)

## 2025-02-10 NOTE — TELEPHONE ENCOUNTER
Returned patients phone call - spoke with patient and informed her that our preadmit dept will call her after 230pm today to give her the final arrival time for surgery     Verbalized understanding and thankful for call

## 2025-02-11 ENCOUNTER — ANESTHESIA (OUTPATIENT)
Dept: SURGERY | Facility: HOSPITAL | Age: 70
End: 2025-02-11
Payer: MEDICARE

## 2025-02-11 ENCOUNTER — ANESTHESIA EVENT (OUTPATIENT)
Dept: SURGERY | Facility: HOSPITAL | Age: 70
End: 2025-02-11
Payer: MEDICARE

## 2025-02-11 ENCOUNTER — HOSPITAL ENCOUNTER (OUTPATIENT)
Facility: HOSPITAL | Age: 70
Discharge: HOME | End: 2025-02-12
Attending: ORTHOPAEDIC SURGERY | Admitting: ORTHOPAEDIC SURGERY
Payer: MEDICARE

## 2025-02-11 DIAGNOSIS — Z01.818 PREOP TESTING: ICD-10-CM

## 2025-02-11 DIAGNOSIS — M16.11 ARTHRITIS OF RIGHT HIP: Primary | ICD-10-CM

## 2025-02-11 LAB
HCT VFR BLD AUTO: 39.6 % (ref 37–48.5)
HGB BLD-MCNC: 12 G/DL (ref 12–16)

## 2025-02-11 PROCEDURE — 97162 PT EVAL MOD COMPLEX 30 MIN: CPT

## 2025-02-11 PROCEDURE — 25000003 PHARM REV CODE 250: Performed by: ANESTHESIOLOGY

## 2025-02-11 PROCEDURE — 97530 THERAPEUTIC ACTIVITIES: CPT

## 2025-02-11 PROCEDURE — 36000711: Performed by: ORTHOPAEDIC SURGERY

## 2025-02-11 PROCEDURE — 63600175 PHARM REV CODE 636 W HCPCS: Mod: JZ,TB | Performed by: ANESTHESIOLOGY

## 2025-02-11 PROCEDURE — 85018 HEMOGLOBIN: CPT | Performed by: PHYSICIAN ASSISTANT

## 2025-02-11 PROCEDURE — 25000003 PHARM REV CODE 250: Performed by: ORTHOPAEDIC SURGERY

## 2025-02-11 PROCEDURE — 85014 HEMATOCRIT: CPT | Performed by: PHYSICIAN ASSISTANT

## 2025-02-11 PROCEDURE — 25000003 PHARM REV CODE 250: Performed by: NURSE ANESTHETIST, CERTIFIED REGISTERED

## 2025-02-11 PROCEDURE — 63600175 PHARM REV CODE 636 W HCPCS: Performed by: NURSE ANESTHETIST, CERTIFIED REGISTERED

## 2025-02-11 PROCEDURE — 94799 UNLISTED PULMONARY SVC/PX: CPT

## 2025-02-11 PROCEDURE — 37000008 HC ANESTHESIA 1ST 15 MINUTES: Performed by: ORTHOPAEDIC SURGERY

## 2025-02-11 PROCEDURE — 25000003 PHARM REV CODE 250: Performed by: PHYSICIAN ASSISTANT

## 2025-02-11 PROCEDURE — 36000710: Performed by: ORTHOPAEDIC SURGERY

## 2025-02-11 PROCEDURE — 27201423 OPTIME MED/SURG SUP & DEVICES STERILE SUPPLY: Performed by: ORTHOPAEDIC SURGERY

## 2025-02-11 PROCEDURE — 37000009 HC ANESTHESIA EA ADD 15 MINS: Performed by: ORTHOPAEDIC SURGERY

## 2025-02-11 PROCEDURE — 27130 TOTAL HIP ARTHROPLASTY: CPT | Mod: RT,,, | Performed by: ORTHOPAEDIC SURGERY

## 2025-02-11 PROCEDURE — C1776 JOINT DEVICE (IMPLANTABLE): HCPCS | Performed by: ORTHOPAEDIC SURGERY

## 2025-02-11 PROCEDURE — C1713 ANCHOR/SCREW BN/BN,TIS/BN: HCPCS | Performed by: ORTHOPAEDIC SURGERY

## 2025-02-11 PROCEDURE — 27130 TOTAL HIP ARTHROPLASTY: CPT | Mod: AS,RT,, | Performed by: PHYSICIAN ASSISTANT

## 2025-02-11 PROCEDURE — 63600175 PHARM REV CODE 636 W HCPCS: Performed by: ORTHOPAEDIC SURGERY

## 2025-02-11 PROCEDURE — 63600175 PHARM REV CODE 636 W HCPCS: Performed by: PHYSICIAN ASSISTANT

## 2025-02-11 PROCEDURE — 71000033 HC RECOVERY, INTIAL HOUR: Performed by: ORTHOPAEDIC SURGERY

## 2025-02-11 PROCEDURE — 71000039 HC RECOVERY, EACH ADD'L HOUR: Performed by: ORTHOPAEDIC SURGERY

## 2025-02-11 DEVICE — SCREW PINNACLE 6.5 X 20: Type: IMPLANTABLE DEVICE | Site: HIP | Status: FUNCTIONAL

## 2025-02-11 DEVICE — LINER ACET ALTRX NEUT 32X50MM: Type: IMPLANTABLE DEVICE | Site: HIP | Status: FUNCTIONAL

## 2025-02-11 DEVICE — IMPLANTABLE DEVICE: Type: IMPLANTABLE DEVICE | Site: HIP | Status: FUNCTIONAL

## 2025-02-11 DEVICE — HEAD DLT TS CER 12-14 32MM +1: Type: IMPLANTABLE DEVICE | Site: HIP | Status: FUNCTIONAL

## 2025-02-11 DEVICE — CUP ACT PINC SECT 50MM TITANIU: Type: IMPLANTABLE DEVICE | Site: HIP | Status: FUNCTIONAL

## 2025-02-11 DEVICE — SCREW BONE CANCACT 6.5X15: Type: IMPLANTABLE DEVICE | Site: HIP | Status: FUNCTIONAL

## 2025-02-11 RX ORDER — CEFAZOLIN 2 G/1
2 INJECTION, POWDER, FOR SOLUTION INTRAMUSCULAR; INTRAVENOUS
Status: COMPLETED | OUTPATIENT
Start: 2025-02-11 | End: 2025-02-11

## 2025-02-11 RX ORDER — DOCUSATE SODIUM 100 MG/1
100 CAPSULE, LIQUID FILLED ORAL 2 TIMES DAILY
Status: CANCELLED | OUTPATIENT
Start: 2025-02-11

## 2025-02-11 RX ORDER — CHLORHEXIDINE GLUCONATE ORAL RINSE 1.2 MG/ML
10 SOLUTION DENTAL 2 TIMES DAILY
Status: CANCELLED | OUTPATIENT
Start: 2025-02-11 | End: 2025-02-16

## 2025-02-11 RX ORDER — SODIUM CHLORIDE 9 MG/ML
INJECTION, SOLUTION INTRAVENOUS CONTINUOUS
Status: DISCONTINUED | OUTPATIENT
Start: 2025-02-11 | End: 2025-02-11

## 2025-02-11 RX ORDER — MIDAZOLAM HYDROCHLORIDE 1 MG/ML
INJECTION INTRAMUSCULAR; INTRAVENOUS
Status: DISCONTINUED | OUTPATIENT
Start: 2025-02-11 | End: 2025-02-11

## 2025-02-11 RX ORDER — SODIUM CHLORIDE, SODIUM LACTATE, POTASSIUM CHLORIDE, CALCIUM CHLORIDE 600; 310; 30; 20 MG/100ML; MG/100ML; MG/100ML; MG/100ML
INJECTION, SOLUTION INTRAVENOUS CONTINUOUS
Status: DISCONTINUED | OUTPATIENT
Start: 2025-02-11 | End: 2025-02-11

## 2025-02-11 RX ORDER — ACETAMINOPHEN 325 MG/1
650 TABLET ORAL EVERY 8 HOURS PRN
Status: DISCONTINUED | OUTPATIENT
Start: 2025-02-11 | End: 2025-02-12 | Stop reason: HOSPADM

## 2025-02-11 RX ORDER — BISACODYL 10 MG/1
10 SUPPOSITORY RECTAL DAILY PRN
Status: CANCELLED | OUTPATIENT
Start: 2025-02-11

## 2025-02-11 RX ORDER — OXYCODONE HYDROCHLORIDE 5 MG/1
5 TABLET ORAL
Status: CANCELLED | OUTPATIENT
Start: 2025-02-11

## 2025-02-11 RX ORDER — ACETAMINOPHEN 325 MG/1
650 TABLET ORAL EVERY 8 HOURS PRN
Status: CANCELLED | OUTPATIENT
Start: 2025-02-11

## 2025-02-11 RX ORDER — ROCURONIUM BROMIDE 10 MG/ML
INJECTION, SOLUTION INTRAVENOUS
Status: DISCONTINUED | OUTPATIENT
Start: 2025-02-11 | End: 2025-02-11

## 2025-02-11 RX ORDER — MORPHINE SULFATE 4 MG/ML
2 INJECTION, SOLUTION INTRAMUSCULAR; INTRAVENOUS EVERY 4 HOURS PRN
Status: CANCELLED | OUTPATIENT
Start: 2025-02-11

## 2025-02-11 RX ORDER — OXYCODONE AND ACETAMINOPHEN 5; 325 MG/1; MG/1
1 TABLET ORAL EVERY 4 HOURS PRN
Status: DISCONTINUED | OUTPATIENT
Start: 2025-02-11 | End: 2025-02-12 | Stop reason: HOSPADM

## 2025-02-11 RX ORDER — IBUPROFEN 200 MG
24 TABLET ORAL
Status: DISCONTINUED | OUTPATIENT
Start: 2025-02-11 | End: 2025-02-12 | Stop reason: HOSPADM

## 2025-02-11 RX ORDER — ACETAMINOPHEN 325 MG/1
650 TABLET ORAL EVERY 4 HOURS PRN
Status: DISCONTINUED | OUTPATIENT
Start: 2025-02-11 | End: 2025-02-12 | Stop reason: HOSPADM

## 2025-02-11 RX ORDER — PHENYLEPHRINE HYDROCHLORIDE 10 MG/ML
INJECTION INTRAVENOUS
Status: DISCONTINUED | OUTPATIENT
Start: 2025-02-11 | End: 2025-02-11

## 2025-02-11 RX ORDER — ONDANSETRON HYDROCHLORIDE 2 MG/ML
4 INJECTION, SOLUTION INTRAVENOUS DAILY PRN
Status: DISCONTINUED | OUTPATIENT
Start: 2025-02-11 | End: 2025-02-11 | Stop reason: HOSPADM

## 2025-02-11 RX ORDER — ONDANSETRON HYDROCHLORIDE 2 MG/ML
INJECTION, SOLUTION INTRAVENOUS
Status: DISCONTINUED | OUTPATIENT
Start: 2025-02-11 | End: 2025-02-11

## 2025-02-11 RX ORDER — DEXAMETHASONE SODIUM PHOSPHATE 4 MG/ML
8 INJECTION, SOLUTION INTRA-ARTICULAR; INTRALESIONAL; INTRAMUSCULAR; INTRAVENOUS; SOFT TISSUE
Status: DISCONTINUED | OUTPATIENT
Start: 2025-02-11 | End: 2025-02-11 | Stop reason: HOSPADM

## 2025-02-11 RX ORDER — PANTOPRAZOLE SODIUM 40 MG/1
40 TABLET, DELAYED RELEASE ORAL DAILY
Status: DISCONTINUED | OUTPATIENT
Start: 2025-02-12 | End: 2025-02-12 | Stop reason: HOSPADM

## 2025-02-11 RX ORDER — DOCUSATE SODIUM 100 MG/1
100 CAPSULE, LIQUID FILLED ORAL 2 TIMES DAILY
Status: DISCONTINUED | OUTPATIENT
Start: 2025-02-11 | End: 2025-02-12 | Stop reason: HOSPADM

## 2025-02-11 RX ORDER — OXYCODONE AND ACETAMINOPHEN 5; 325 MG/1; MG/1
1 TABLET ORAL
Status: DISCONTINUED | OUTPATIENT
Start: 2025-02-11 | End: 2025-02-11 | Stop reason: HOSPADM

## 2025-02-11 RX ORDER — ACETAMINOPHEN 500 MG
1000 TABLET ORAL EVERY 6 HOURS PRN
COMMUNITY

## 2025-02-11 RX ORDER — ROPIVACAINE/EPI/CLONIDINE/KET 2.46-0.005
SYRINGE (ML) INJECTION
Status: DISCONTINUED | OUTPATIENT
Start: 2025-02-11 | End: 2025-02-11 | Stop reason: HOSPADM

## 2025-02-11 RX ORDER — HYDROMORPHONE HYDROCHLORIDE 1 MG/ML
0.2 INJECTION, SOLUTION INTRAMUSCULAR; INTRAVENOUS; SUBCUTANEOUS EVERY 5 MIN PRN
Status: DISCONTINUED | OUTPATIENT
Start: 2025-02-11 | End: 2025-02-11 | Stop reason: HOSPADM

## 2025-02-11 RX ORDER — CEFAZOLIN SODIUM 1 G/3ML
2 INJECTION, POWDER, FOR SOLUTION INTRAMUSCULAR; INTRAVENOUS
Status: CANCELLED | OUTPATIENT
Start: 2025-02-11 | End: 2025-02-12

## 2025-02-11 RX ORDER — DEXAMETHASONE SODIUM PHOSPHATE 4 MG/ML
8 INJECTION, SOLUTION INTRA-ARTICULAR; INTRALESIONAL; INTRAMUSCULAR; INTRAVENOUS; SOFT TISSUE EVERY 24 HOURS
Status: CANCELLED | OUTPATIENT
Start: 2025-02-12 | End: 2025-02-13

## 2025-02-11 RX ORDER — INSULIN ASPART 100 [IU]/ML
0-10 INJECTION, SOLUTION INTRAVENOUS; SUBCUTANEOUS
Status: DISCONTINUED | OUTPATIENT
Start: 2025-02-11 | End: 2025-02-12 | Stop reason: HOSPADM

## 2025-02-11 RX ORDER — GLUCAGON 1 MG
1 KIT INJECTION
Status: DISCONTINUED | OUTPATIENT
Start: 2025-02-11 | End: 2025-02-12 | Stop reason: HOSPADM

## 2025-02-11 RX ORDER — LIDOCAINE HYDROCHLORIDE 10 MG/ML
INJECTION, SOLUTION EPIDURAL; INFILTRATION; INTRACAUDAL; PERINEURAL
Status: DISCONTINUED | OUTPATIENT
Start: 2025-02-11 | End: 2025-02-11

## 2025-02-11 RX ORDER — GABAPENTIN 300 MG/1
300 CAPSULE ORAL DAILY
Status: DISCONTINUED | OUTPATIENT
Start: 2025-02-11 | End: 2025-02-12 | Stop reason: HOSPADM

## 2025-02-11 RX ORDER — OXYCODONE HYDROCHLORIDE 5 MG/1
10 TABLET ORAL
Status: CANCELLED | OUTPATIENT
Start: 2025-02-11

## 2025-02-11 RX ORDER — IBUPROFEN 200 MG
16 TABLET ORAL
Status: DISCONTINUED | OUTPATIENT
Start: 2025-02-11 | End: 2025-02-12 | Stop reason: HOSPADM

## 2025-02-11 RX ORDER — ONDANSETRON 8 MG/1
8 TABLET, ORALLY DISINTEGRATING ORAL EVERY 8 HOURS PRN
Status: CANCELLED | OUTPATIENT
Start: 2025-02-11

## 2025-02-11 RX ORDER — CHLORHEXIDINE GLUCONATE ORAL RINSE 1.2 MG/ML
10 SOLUTION DENTAL 2 TIMES DAILY
Status: DISCONTINUED | OUTPATIENT
Start: 2025-02-11 | End: 2025-02-12 | Stop reason: HOSPADM

## 2025-02-11 RX ORDER — TRANEXAMIC ACID 10 MG/ML
1000 INJECTION, SOLUTION INTRAVENOUS
Status: COMPLETED | OUTPATIENT
Start: 2025-02-11 | End: 2025-02-11

## 2025-02-11 RX ORDER — ONDANSETRON 4 MG/1
8 TABLET, ORALLY DISINTEGRATING ORAL EVERY 8 HOURS PRN
Qty: 21 TABLET | Refills: 0 | OUTPATIENT
Start: 2025-02-11

## 2025-02-11 RX ORDER — CHLORHEXIDINE GLUCONATE ORAL RINSE 1.2 MG/ML
10 SOLUTION DENTAL
Status: DISCONTINUED | OUTPATIENT
Start: 2025-02-11 | End: 2025-02-11 | Stop reason: HOSPADM

## 2025-02-11 RX ORDER — SODIUM CHLORIDE 9 MG/ML
INJECTION, SOLUTION INTRAVENOUS CONTINUOUS
Status: CANCELLED | OUTPATIENT
Start: 2025-02-11

## 2025-02-11 RX ORDER — METOPROLOL TARTRATE 50 MG/1
50 TABLET ORAL 2 TIMES DAILY
Status: DISCONTINUED | OUTPATIENT
Start: 2025-02-11 | End: 2025-02-12 | Stop reason: HOSPADM

## 2025-02-11 RX ORDER — MORPHINE SULFATE 2 MG/ML
2 INJECTION, SOLUTION INTRAMUSCULAR; INTRAVENOUS EVERY 4 HOURS PRN
Status: DISCONTINUED | OUTPATIENT
Start: 2025-02-11 | End: 2025-02-12 | Stop reason: HOSPADM

## 2025-02-11 RX ORDER — ONDANSETRON 4 MG/1
4 TABLET, ORALLY DISINTEGRATING ORAL EVERY 8 HOURS PRN
Status: DISCONTINUED | OUTPATIENT
Start: 2025-02-11 | End: 2025-02-12 | Stop reason: HOSPADM

## 2025-02-11 RX ORDER — ONDANSETRON HYDROCHLORIDE 2 MG/ML
4 INJECTION, SOLUTION INTRAVENOUS EVERY 12 HOURS PRN
Status: CANCELLED | OUTPATIENT
Start: 2025-02-11

## 2025-02-11 RX ORDER — PROPOFOL 10 MG/ML
VIAL (ML) INTRAVENOUS
Status: DISCONTINUED | OUTPATIENT
Start: 2025-02-11 | End: 2025-02-11

## 2025-02-11 RX ADMIN — CEFAZOLIN 2 G: 2 INJECTION, POWDER, FOR SOLUTION INTRAMUSCULAR; INTRAVENOUS at 09:02

## 2025-02-11 RX ADMIN — MIDAZOLAM HYDROCHLORIDE 2 MG: 1 INJECTION, SOLUTION INTRAMUSCULAR; INTRAVENOUS at 09:02

## 2025-02-11 RX ADMIN — DOCUSATE SODIUM 100 MG: 100 CAPSULE, LIQUID FILLED ORAL at 09:02

## 2025-02-11 RX ADMIN — OXYCODONE HYDROCHLORIDE AND ACETAMINOPHEN 1 TABLET: 5; 325 TABLET ORAL at 06:02

## 2025-02-11 RX ADMIN — TRANEXAMIC ACID 1000 MG: 10 INJECTION, SOLUTION INTRAVENOUS at 11:02

## 2025-02-11 RX ADMIN — OXYCODONE HYDROCHLORIDE AND ACETAMINOPHEN 1 TABLET: 5; 325 TABLET ORAL at 02:02

## 2025-02-11 RX ADMIN — HYDROMORPHONE HYDROCHLORIDE 0.2 MG: 1 INJECTION, SOLUTION INTRAMUSCULAR; INTRAVENOUS; SUBCUTANEOUS at 11:02

## 2025-02-11 RX ADMIN — PROPOFOL 150 MG: 10 INJECTION, EMULSION INTRAVENOUS at 09:02

## 2025-02-11 RX ADMIN — HYDROMORPHONE HYDROCHLORIDE 0.2 MG: 1 INJECTION, SOLUTION INTRAMUSCULAR; INTRAVENOUS; SUBCUTANEOUS at 12:02

## 2025-02-11 RX ADMIN — SODIUM CHLORIDE, SODIUM LACTATE, POTASSIUM CHLORIDE, AND CALCIUM CHLORIDE: .6; .31; .03; .02 INJECTION, SOLUTION INTRAVENOUS at 11:02

## 2025-02-11 RX ADMIN — PROPOFOL 50 MG: 10 INJECTION, EMULSION INTRAVENOUS at 09:02

## 2025-02-11 RX ADMIN — ROCURONIUM BROMIDE 50 MG: 10 INJECTION, SOLUTION INTRAVENOUS at 09:02

## 2025-02-11 RX ADMIN — ONDANSETRON 4 MG: 2 INJECTION INTRAMUSCULAR; INTRAVENOUS at 11:02

## 2025-02-11 RX ADMIN — CHLORHEXIDINE GLUCONATE 0.12% ORAL RINSE 10 ML: 1.2 LIQUID ORAL at 09:02

## 2025-02-11 RX ADMIN — SODIUM CHLORIDE, POTASSIUM CHLORIDE, SODIUM LACTATE AND CALCIUM CHLORIDE: 600; 310; 30; 20 INJECTION, SOLUTION INTRAVENOUS at 06:02

## 2025-02-11 RX ADMIN — GABAPENTIN 300 MG: 300 CAPSULE ORAL at 09:02

## 2025-02-11 RX ADMIN — PHENYLEPHRINE HYDROCHLORIDE 100 MCG: 10 INJECTION INTRAVENOUS at 09:02

## 2025-02-11 RX ADMIN — LIDOCAINE HYDROCHLORIDE 50 MG: 10 SOLUTION INTRAVENOUS at 09:02

## 2025-02-11 RX ADMIN — TRANEXAMIC ACID 1000 MG: 10 INJECTION, SOLUTION INTRAVENOUS at 09:02

## 2025-02-11 RX ADMIN — SODIUM CHLORIDE, SODIUM LACTATE, POTASSIUM CHLORIDE, AND CALCIUM CHLORIDE: .6; .31; .03; .02 INJECTION, SOLUTION INTRAVENOUS at 09:02

## 2025-02-11 RX ADMIN — DEXAMETHASONE SODIUM PHOSPHATE 8 MG: 4 INJECTION, SOLUTION INTRA-ARTICULAR; INTRALESIONAL; INTRAMUSCULAR; INTRAVENOUS; SOFT TISSUE at 09:02

## 2025-02-11 RX ADMIN — CHLORHEXIDINE GLUCONATE 0.12% ORAL RINSE 10 ML: 1.2 LIQUID ORAL at 08:02

## 2025-02-11 RX ADMIN — SUGAMMADEX 200 MG: 100 INJECTION, SOLUTION INTRAVENOUS at 11:02

## 2025-02-11 NOTE — TRANSFER OF CARE
"Anesthesia Transfer of Care Note    Patient: Sandra Sanders    Procedure(s) Performed: Procedure(s) (LRB):  ARTHROPLASTY, HIP (Right)    Patient location: PACU    Anesthesia Type: general    Transport from OR: Transported from OR on room air with adequate spontaneous ventilation    Post pain: adequate analgesia    Post assessment: no apparent anesthetic complications    Post vital signs: stable    Level of consciousness: sedated    Nausea/Vomiting: no nausea/vomiting    Complications: none    Transfer of care protocol was followed      Last vitals: Visit Vitals  /68 (BP Location: Right arm, Patient Position: Sitting)   Pulse 83   Temp 36.7 °C (98.1 °F) (Temporal)   Resp 18   Ht 5' 3" (1.6 m)   SpO2 (!) 94%   Breastfeeding No   BMI 46.04 kg/m²     "

## 2025-02-11 NOTE — DISCHARGE INSTRUCTIONS
Patient instructions for joint replacement    Before surgery  1.  Shower with Hibiclens soap/antibacterial for 3 days prior to surgery to decrease risk of infection  2..  Stop all blood thinners/aspirin, Coumadin, warfarin, Plavix, Eliquis, Xarelto etc 5 days prior to surgery  3.  Take Celebrex 400 mg the night prior to surgery after dinner or meloxicam 15 mg  4.  Take Tylenol 650 mg the night prior to surgery    Ask physicians for prescription of Celebrex or Mobic if needed    After surgery at home  1.  Take Tylenol 650 mg 3 times a day for 14 days then as needed for mild pain  2.  Take all your home medications starting in a.m. 1st day after surgery  3.    4.  Must take aspirin 81 mg twice a day for 6 weeks unless you are on other blood thinners/Plavix, Eliquis, Xarelto, Coumadin etc  5.  Must wear compressive stockings for 6 weeks minimum to decrease the risk of blood clot and swelling  6.  Hydrocodone/Norco or oxycodone/Percocet will be prescribed to take every 6 hr as needed for breakthrough pain.  Can not prescribe you any narcotics since you see pain management in you picked up your prescription a week ago    7.  May apply ice on the hip/knee to help with decreasing pain  8.  Keep wound dry for 2 weeks until stitches/staples removed than you will be allowed to shower in 24 hr and get the wound wet.  No soaking of the wound in the tub or swimming for 4 weeks after surgery  9.  No driving for 4 weeks unless specified by physician  10.  Avoid touching the wound or surrounding area /at least 2 inches on each side of the surgical incision until staples are removed/stitches   11.  Must wear your knee immobilizer while you sleeping for the next 3 weeks.  May remove the knee immobilizer when you are awake and up and walking   12.  Leave hospital dressing on for 3 days then may change by applying sterile 4 x 4 and Ace wrap after cleaning with Betadine or peroxide.  May leave this dressing change to home health  nurses        Rapid(short acting) and Long-Acting Insulin Instructions     There are many types of insulin that can be prescribed. You are prescribed rapid and long acting insulin to manage your diabetes. It is very important to take these medications exactly how they are prescribed!     Rapid(short)-Acting Insulin: This insulin is taken ONLY at mealtimes!   Insulin Names: Humalog, Novolog, Apidra, Fiasp, Admelog, Lyumjev      ALWAYS TEST YOUR BLOOD GLUCOSE BEFORE eating and BEFORE going to bed.   Eat within 15 minutes of taking the meal insulin shot.    If you skip a meal; skip the meal insulin shot.    Wait at least 4 hours or more between meals and shots.        Long-Acting Insulin: This insulin is taken once or twice daily   Insulin Names: Levemir, Lantus, Toujeo, Basaglar, Tresiba      Insulin continues working for 20-24 hours.    Take insulin at the same time each day or if taking twice daily, schedule every 12 hours.   You DO NOT have to eat when this type of insulin is taken.    DO NOT SKIP YOUR INSULIN SHOTS.    You should use the same dose every day.        Our goal at [facility] is to always give you outstanding care and exceptional service. You may receive a survey by mail, text or e-mail in the next 7-10 days from Elsa Taylor and our leadership team asking about the care you received with us. The survey should only take 5-10 minutes to complete and is very important to us.     Your feedback provides us with a way to recognize our staff who work tirelessly to provide the best care! Also, your responses help us learn how to improve when your experience was below our aspiration of excellence. We WILL use your feedback to continue making improvements to help us provide the highest quality care. We keep your personal information and feedback confidential. We appreciate your time completing this survey and can't wait to hear from you!!!     We want you to leave us today feeling like you can DEFINITELY recommend  us to others! We look forward to your continued care with us! Thanks so much for choosing [facility] for your healthcare needs!

## 2025-02-11 NOTE — ANESTHESIA PROCEDURE NOTES
Intubation    Date/Time: 2/11/2025 9:41 AM    Performed by: Ana Perea CRNA  Authorized by: Olive Huddleston MD    Intubation:     Induction:  Intravenous    Intubated:  Postinduction    Mask Ventilation:  Easy with oral airway    Attempts:  1    Attempted By:  CRNA    Method of Intubation:  Direct    Blade:  Amy 3    Laryngeal View Grade: Grade I - full view of cords      Difficult Airway Encountered?: No      Complications:  None    Airway Device:  Oral endotracheal tube    Airway Device Size:  7.5    Style/Cuff Inflation:  Cuffed (inflated to minimal occlusive pressure)    Tube secured:  21    Secured at:  The lips    Placement Verified By:  Capnometry and Revisualization with laryngoscopy    Complicating Factors:  None    Findings Post-Intubation:  BS equal bilateral and atraumatic/condition of teeth unchanged

## 2025-02-11 NOTE — PLAN OF CARE
[facility] (Hospital)  Discharge Assessment    Primary Care Provider: Heber Tuttle MD     Discharge Assessment (most recent)       BRIEF DISCHARGE ASSESSMENT - 02/11/25 0921          Discharge Planning    Assessment Type Discharge Planning Brief Assessment     Resource/Environmental Concerns none     Support Systems Children     Equipment Currently Used at Home none     Current Living Arrangements home     Patient/Family Anticipates Transition to home with family     Patient/Family Anticipated Services at Transition home health care     DME Needed Upon Discharge  walker, rolling     Discharge Plan A Home with family;Home Health                     CM spoke with patient and daughter to discuss d/c planning. Patient will need a rolling walker upon discharge. Referral sent to [facility] by clinic staff.

## 2025-02-11 NOTE — PLAN OF CARE
PT EVAL complete. Required MIN A for bed mobility, ambulated 20ft CGA using RW. Recommending low intensity therapy.

## 2025-02-11 NOTE — H&P
Subjective:     Patient ID: Sandra Sanders is a 69 y.o. female.    Chief Complaint: No chief complaint on file.    HPI:  01/06/2025    Patient presents as a new patient to me today with chief complaint of right hip pain.  Patient notes pain is at worst a 10/10 affecting activity of daily living and thus her quality of life.  She presents today in a wheelchair she was unsure how long she had to walk.  Patient states she is only able to ambulate with a walker /Rollator and even then short distances at best.  She has a hard time going from a sitting to standing or standing to seated position, unlevel terrain or stairs.  Difficulty with putting shoes and socks.  The patient states she has been struggling with her hip for some time and was actually due to have a hip replacement shortly after her left hip which performed in 2009 by Dr. [facility].  She states given the complication and having to go back into surgery her and her  decided to wait on the right until it became completely unbearable which it has been.  Patient  has passed away and she is on her own.  She does have a daughter that could help.  She has been followed by Dr. Voss and Dr. Ocampo who wanted her to lose weight prior to surgery and wanted to fix her left shoulder.  Has been in therapy since 2017 with Prasanth doing aqua therapy 3 times a week, received multiple hip injections as well as a variety of medications with minimal at best relief.  The patient states she was deemed not a surgical candidate after losing 80 lb by his office and it was started she can no longer live like this at her age once more for her life.  She reaches out today for a 2nd opinion.     She is under pain management receives her oxycodone, prednisone as needed, methotrexate and diagnosed with peripheral neuropathy and on gabapentin.  Patient underwent left total hip by Dr. [facility] December of 2009 per [facility] review.  Patient was taken back roughly 1  week later for concern of hematoma and washout was performed.  No infection was noted at the time.  She states she has had no pain, injury, complication or complaint of the left hip     Patient has been in therapy with [facility] since 2017 doing aqua therapy 3 times a week  She has received multiple hip injections with minimal at best relief  Additionally she does note sciatica to the left side radiating down her leg and knows she has back issues      Additionally patient had a double bypass in 2013 and is followed by Dr. Khanna for cardiology   She has changed her PCP to Dr. Tuttle  in as Dr. Iverson has retired     Patient is presently denying any shortness of breath, chest pain, fever/chills, nausea/vomiting, difficulty with chewing or swallowing loss of bowel bladder control blurry vision double vision loss sense smell or taste  Patient is here with a daughter/nurse Althea    Patient had open heart surgery in 2013.  Had stress test recently by Dr. lionel Uribe .  It weights clearance to undergo surgery      Past Medical History:   Diagnosis Date    Anxiety     Asthma     CHF (congestive heart failure)     Diabetes mellitus, type 2     GERD (gastroesophageal reflux disease)     Hyperlipidemia      Past Surgical History:   Procedure Laterality Date    BYPASS GRAFT  08/06/2013    CHOLECYSTECTOMY  2023    CYST REMOVAL Right 1995    HIP SURGERY Left     2009    HYSTERECTOMY  2015    LAPAROSCOPIC SLEEVE GASTRECTOMY  2020    LEFT HEART CATHETERIZATION  01/17/2025    MULTIPLE TOOTH EXTRACTIONS  03/2015    TUBAL LIGATION Bilateral 1982    [facility]     No family history on file.  Social History     Socioeconomic History    Marital status:    Tobacco Use    Smoking status: Former    Smokeless tobacco: Never   Substance and Sexual Activity    Alcohol use: Not Currently    Drug use: Never     Social Drivers of Health     Transportation Needs: Unknown (4/15/2022)    Received from [facility] and [facility]sidiaries and Affiliates    [place] - Transportation     Lack of Transportation (Non-Medical): No     Medication List with Changes/Refills   Current Medications    ALPRAZOLAM (XANAX) 0.5 MG TABLET    SMARTSI Tablet(s) By Mouth Morning-Evening    ALPRAZOLAM (XANAX) 1 MG TABLET        BLOOD SUGAR DIAGNOSTIC (TRUE METRIX GLUCOSE TEST STRIP) STRP    USE AS DIRECTED FOR GLUCOSE MONITORING THREE TIMES DAILY    BLOOD-GLUCOSE METER (TRUE METRIX GLUCOSE METER) MISC    USE AS DIRECTED TO CHECK GLUCOSE TESTING THREE TIMES DAILY    BUSPIRONE (BUSPAR) 7.5 MG TABLET    Take 7.5 mg by mouth once daily.    CETIRIZINE (ZYRTEC) 10 MG TABLET    Take 1 tablet by mouth once daily.    CHOLECALCIFEROL, VITAMIN D3, (DECARA) 50,000 UNIT CAPSULE        CLOPIDOGREL (PLAVIX) 75 MG TABLET    Take 75 mg by mouth once daily.    DEXLANSOPRAZOLE (DEXILANT) 60 MG CAPSULE    Take 60 mg by mouth once daily.    DICYCLOMINE (BENTYL) 20 MG TABLET    Take 20 mg by mouth as needed.    DOCUSATE SODIUM (COLACE) 100 MG CAPSULE    Take 1 capsule by mouth 2 (two) times daily as needed.    FLUCONAZOLE (DIFLUCAN) 150 MG TAB    Take 150 mg by mouth as needed.    FLUTICASONE (FLONASE) 50 MCG/ACTUATION NASAL SPRAY        FOLIC ACID (FOLVITE) 1 MG TABLET    Take 1 tablet by mouth every morning.    FUROSEMIDE (LASIX) 40 MG TABLET    Take 1 tablet by mouth 2 (two) times daily.    GABAPENTIN (NEURONTIN) 100 MG CAPSULE    Take 1 capsule by mouth 2 (two) times daily.    GUAIFENESIN (MUCINEX) 600 MG 12 HR TABLET    Take 600 mg by mouth as needed.    INSULIN ASPART U-100 (NOVOLOG FLEXPEN U-100 INSULIN) 100 UNIT/ML INPN PEN        INSULIN DETEMIR U-100 (LEVEMIR FLEXTOUCH U-100 INSULN) 100 UNIT/ML (3 ML) SUBQ INPN PEN        LACTOBAC. RHAMNOSUS GG-INULIN 10 BILLION CELL -200 MG CPSP    Take by mouth once daily.    LEVOCETIRIZINE (XYZAL) 5 MG TABLET        LIVALO 2 MG TAB TABLET        METHOTREXATE 2.5 MG TAB    Take 8 tablets by mouth once a week.     METOPROLOL TARTRATE (LOPRESSOR) 50 MG TABLET        MOUNJARO 7.5 MG/0.5 ML PNIJ    INJECT 7.5MG UNDER THE SKIN EVERY 7 DAYS    MOVANTIK 12.5 MG TAB    Take 1 tablet by mouth every morning.    MUPIROCIN (BACTROBAN) 2 % OINTMENT    Apply topically 3 (three) times daily.    NITROFURANTOIN, MACROCRYSTAL-MONOHYDRATE, (MACROBID) 100 MG CAPSULE    Take 100 mg by mouth 2 (two) times daily.    NITROGLYCERIN (NITROSTAT) 0.4 MG SL TABLET    Place 0.4 mg under the tongue.    ONDANSETRON (ZOFRAN-ODT) 8 MG TBDL    Take 8 mg by mouth as needed.    PHENOL-GLYCERIN (CHLORASEPTIC MAX SORE THROAT) 1.5-33 % SPRY    SPRAY  .    PREDNISONE (DELTASONE) 5 MG TABLET    Take 5 mg by mouth as needed.    PROMETHAZINE (PHENERGAN) 25 MG TABLET        QVAR 40 MCG/ACTUATION AERO    INHALE 2 PUFFS BY MOUTH TWICE DAILY    ROXICODONE 30 MG TAB        TRINTELLIX 10 MG TAB    Take 1 tablet by mouth once daily.     Review of patient's allergies indicates:   Allergen Reactions    Insulin lispro     Insulin regular human     Metformin     Nsaids (non-steroidal anti-inflammatory drug)     Statins-hmg-coa reductase inhibitors      Review of Systems   Constitutional: Negative for decreased appetite.   HENT:  Negative for tinnitus.    Eyes:  Negative for double vision.   Cardiovascular:  Negative for chest pain.   Respiratory:  Negative for wheezing.    Hematologic/Lymphatic: Negative for bleeding problem.   Skin:  Negative for dry skin.   Musculoskeletal:  Positive for arthritis, back pain, joint pain, muscle weakness, myalgias and stiffness. Negative for gout, joint swelling and neck pain.   Gastrointestinal:  Negative for abdominal pain.   Genitourinary:  Negative for bladder incontinence.   Neurological:  Negative for numbness, paresthesias and sensory change.   Psychiatric/Behavioral:  Negative for altered mental status.        Objective:   There is no height or weight on file to calculate BMI.  There were no vitals filed for this visit.        General    Constitutional: She is oriented to person, place, and time. She appears well-developed.   HENT:   Head: Atraumatic.   Eyes: EOM are normal.   Pulmonary/Chest: Effort normal.   Neurological: She is alert and oriented to person, place, and time.   Psychiatric: Judgment normal.           In a wheelchair.  She has a Rollator at home.    Pelvis in the sitting position is level   Left hip posterior approach passive internal external rotation with excellent motion no pain in the groin.  Able to do hip flexing and abducting and adducting with strength at 5/5  Right hip is stuck at 0° of internal rotation with pelvis tilting.  External rotation 25° abduction 30° with around 15-20 degrees of flexion contracture.  There is quite a bit of pain in the posterior hip joint and slightly anterior hip joint.  Bilateral knees full extension and flexion.  No defect in the patella or quadriceps tendon.  Collaterals and cruciates stable.    Ankle motion is intact it goes up and down without difficulty.  There is some mild pitting edema  Skin is warm to touch no obvious lesions  Relevant imaging results reviewed and interpreted by me, discussed with the patient and / or family today     X-ray 10/17/2024 left BORIS/Dillan according to the patient in excellent alignment.  No evidence of poly wear.  X-ray 10/17/2024 right hip with complete loss of joint space.  Basically fused hip.  Cystic changes in the bone osteophytic changes osteopenic bone consistent with severe arthritis of the right hip  Assessment:     Encounter Diagnoses   Name Primary?    Arthritis of right hip Yes    Hx of total hip arthroplasty, left     Morbid obesity with BMI of 45.0-49.9, adult         Plan:   Arthritis of right hip  -     Place in Outpatient; Standing  -     Vital signs; Standing  -     Insert peripheral IV; Standing  -     Surgeon requests no aspirin,enoxaparin,warfarin,clopidogrel,prasugrel or dabigatran; Standing  -     Clip and Prep Right  Hip; Standing  -     Foot pump; send to OR with patient; Standing  -     Cleanse with Chlorhexidine (CHG); Standing  -     Apply Nozin; Standing  -     Diet NPO; Standing  -     Place CHAD hose; Standing  -     Place sequential compression device; Standing  -     Chlorohexidine Gluconate Bath; Standing  -     Nasal ; Standing    Preop testing  -     EKG 12-lead; Future  -     Comprehensive Metabolic Panel; Future; Expected date: 01/31/2025  -     CBC Auto Differential; Future; Expected date: 01/31/2025  -     Urinalysis; Future; Expected date: 01/31/2025  -     X-Ray Chest 1 View Pre-OP; Future; Expected date: 01/31/2025    Other orders  -     0.9% NaCl infusion  -     IP VTE HIGH RISK PATIENT; Standing  -     ceFAZolin 2 g  -     chlorhexidine 0.12 % solution 10 mL  -     dexAMETHasone injection 8 mg  -     tranexamic acid in NaCl,iso-os IVPB 1,000 mg  -     acetaminophen tablet 650 mg  -     gabapentin capsule 300 mg         There are no outpatient Patient Instructions on file for this admission.    It total hip arthroplasty   Procedure, common risks and benefits,alternatives discussed in details.All questions answered.Patient expressed understanding.Patient instructed to call for any questions that could arise in the future.    Most common Risks:  Infection  Leg-length discrepancy  Dislocation  Neuro-vascular injury ( resulting in loss of motor and sensory functions)  Pain  Blood clot  Fat clot  Loss of motion  Fracture of bone  Failure of procedure to achieve its intended purpose  Failure of hardware  Non-union or mal-union of bone  Malalignment  Death         The mobility limitation can not be sufficiently resolved by the use of a cane.  Patient's functional mobility deficit can be sufficiently resolved with the use of a rolling walker.  Patient has mobility limitation significantly impairs their ability to participate in 1 or more activities of daily living.  The use of a rolling walker we will  significantly improve the patient's ability to participate in  MRADLS and it is to be used on a regular basis in the home.            Disclaimer: This note was prepared using a voice recognition system and is likely to have sound alike errors within the text.

## 2025-02-11 NOTE — PT/OT/SLP EVAL
"Physical Therapy Evaluation and Treatment    Patient Name: Sandra Sanders   MRN: [id number]  Recent Surgery: Procedure(s) (LRB):  ARTHROPLASTY, HIP (Right) Day of Surgery    Recommendations:     Discharge Recommendations: Low Intensity Therapy   Discharge Equipment Recommendations: none   Barriers to discharge: None    Assessment:     Sandra Sanders is a 69 y.o. female admitted with a medical diagnosis of Arthritis of right hip. She presents with the following impairments/functional limitations: impaired endurance, weakness, impaired functional mobility, gait instability, impaired balance, pain, decreased safety awareness, decreased lower extremity function, decreased ROM, orthopedic precautions.    Rehab Prognosis: Good; patient would benefit from acute PT services to address these deficits and reach maximum level of function.    Plan:     During this hospitalization, patient to be seen 3 x/week to address the above listed problems via gait training, therapeutic activities, therapeutic exercises    Plan of Care Expires: 02/25/25    Subjective     Chief Complaint: Pt is motivated to participate  Patient Comments/Goals: none stated  Pain/Comfort:  Pain Rating 1: 6/10  Location - Side 1: Right  Location - Orientation 1: generalized  Location 1: hip  Pain Addressed 1: Reposition, Distraction  Pain Rating Post-Intervention 1: 6/10    Social History:  Living Environment: Patient lives alone in a single story home with ramp. Pt reports caregivers from [id number], 7 days/wk, reports "when they show up." Pt will have assistance from family as well as caregivers.   Prior Level of Function: Prior to admission, patient was modified independent, driving and retired, and ambulated household distances using rollator/power chair, community distances via manual wheelchair.  Equipment Used at Home: walker, rolling, rollator, bedside commode, shower chair, power chair, wheelchair  DME owned (not currently used): " none  Assistance Upon Discharge: family and caregivers    Objective:     Communicated with nurse Obey and epic chart review prior to session. Patient found supine with peripheral IV, wound vac upon PT entry to room.    General Precautions: Standard, fall   Orthopedic Precautions: RLE weight bearing as tolerated, RLE posterior precautions   Braces: Knee immobilizer    Respiratory Status: Room air    Exams:  Cognition: Patient is oriented to Person, Place, Time, Situation  RLE ROM: WFL within precautions  RLE Strength:  NT due to surgery  LLE ROM: WFL  LLE Strength:  Grossly 3+/5  Sensation:    -       Intact  Skin Integrity/Edema:     -       Skin integrity: Visible skin intact    Functional Mobility:  Gait belt applied - Yes  Bed Mobility  Rolling Left: contact guard assistance  Scooting: contact guard assistance  Supine to Sit: minimum assistance for LE management and maintenance of precautions  Sit to Supine: moderate assistance for LE management and maintenance of precautions  Transfers  Sit to Stand: contact guard assistance with rolling walker  Gait  Patient ambulated 20ft with rolling walker and contact guard assistance. Patient demonstrates occasional unsteady gait and antalgic gait. No gross LOB. Gait limited due to dizziness and fatigue. All lines remained intact throughout ambulation trail.  Balance  Sitting: stand by assistance  Standing: contact guard assistance    Therapeutic Activities and Exercises:   Pt educated on role of PT in acute care and POC. Educated on R LE WBAT precautions. Educated on R LE posterior precautions: no hip flexion >90*, no hip adduction, no hip internal rotation. Educated on use of knee immobilizer while in bed. Educated on use of ice, ~20min on and at least 20min off intermittently throughout the day. Educated on ankle pumps to decrease risk of blood clots post-surgery and aid in edema management. Educated to ambulate at least 1x per hour when awake. Educated on importance  "of OOB activities, activity pacing, and HEP (marching/hip flex, hip abd, heel slides/LAQ, quad sets, ankle pumps within precautions) in order to maintain/regain strength. Encouraged to sit up in chair for all meals. Educated on proper use of RW for safety and to reduce risk of falling. Educated on "call don't fall" policy and increased risk of falling due to weakness, instructed to utilize call bell for assistance with all transfers. Pt agreeable to all requests.    AM-PAC 6 CLICK MOBILITY  Total Score:16    Patient left supine with all lines intact, call button in reach, and RN present.    GOALS:   Multidisciplinary Problems       Physical Therapy Goals          Problem: Physical Therapy    Goal Priority Disciplines Outcome Interventions   Physical Therapy Goal     PT, PT/OT     Description: Goals to be met by 2/25/25.  1. Pt will complete bed mobility MOD I.  2. Pt will complete sit to stand MOD I.  3. Pt will ambulate 200ft MOD I using RW.  4. Pt will increase AMPAC score by 2 points to progress functional mobility.                       DME Justifications:  No DME recommended requiring DME justifications    History:     Past Medical History:   Diagnosis Date    Anxiety     Asthma     CHF (congestive heart failure)     Diabetes mellitus, type 2     GERD (gastroesophageal reflux disease)     Hyperlipidemia        Past Surgical History:   Procedure Laterality Date    BYPASS GRAFT  08/06/2013    CHOLECYSTECTOMY  2023    CYST REMOVAL Right 1995    HIP SURGERY Left     2009    HYSTERECTOMY  2015    LAPAROSCOPIC SLEEVE GASTRECTOMY  2020    LEFT HEART CATHETERIZATION  01/17/2025    MULTIPLE TOOTH EXTRACTIONS  03/2015    TUBAL LIGATION Bilateral 1982    [facility]       Time Tracking:     PT Received On: 02/11/25  PT Start Time: 1446  PT Stop Time: 1511  PT Total Time (min): 25 min     Billable Minutes: Evaluation 15min and Therapeutic Activity 10min    2/11/2025    "

## 2025-02-11 NOTE — DISCHARGE SUMMARY
[facility] - Surgery (Hospital)  Discharge Note  Short Stay    Procedure(s) (LRB):  ARTHROPLASTY, HIP (Right)      OUTCOME: Patient tolerated treatment/procedure well without complication and is now ready for discharge.    [place] [facility] [place]AL DIAGNOSIS:  <principal problem not specified>  Right hip arthritis  FOLLOWUP: In clinic    DISCHARGE INSTRUCTIONS:    Discharge Procedure Orders   X-Ray Chest 1 View Pre-OP   Standing Status: Future Number of Occurrences: 1 Standing Exp. Date: 01/31/26     Order Specific Question Answer Comments   May the Radiologist modify the order per protocol to meet the clinical needs of the patient? Yes      Comprehensive Metabolic Panel   Standing Status: Future Number of Occurrences: 1 Standing Exp. Date: 04/01/26     CBC Auto Differential   Standing Status: Future Number of Occurrences: 1 Standing Exp. Date: 04/01/26     Urinalysis   Standing Status: Future Number of Occurrences: 1 Standing Exp. Date: 04/01/26     Order Specific Question Answer Comments   Collection Type Urine, Clean Catch      EKG 12-lead   Standing Status: Future Number of Occurrences: 1 Standing Exp. Date: 01/31/26        TIME SPENT ON DISCHARGE:  30 minutes

## 2025-02-11 NOTE — ANESTHESIA PREPROCEDURE EVALUATION
02/11/2025  Sandra Sanders is a 69 y.o., female.    Patient Active Problem List   Diagnosis    Type II diabetes mellitus with neurological manifestations    Hyperlipemia    CHF (congestive heart failure)    Benign intracranial hypertension    Chronic narcotic dependence    Chronic pain syndrome    CAD (coronary artery disease)    Current moderate episode of major depressive disorder without prior episode    Diverticulitis of colon    Gastroesophageal reflux disease    Generalized anxiety disorder    Hypertension associated with diabetes    Morbid obesity    Osteoporosis    Backache    Polyneuropathy    Rheumatoid arthritis    Type 2 diabetes mellitus    Urinary tract infection    Primary osteoarthritis of right hip    Vitamin D deficiency    Allergies    PONV (postoperative nausea and vomiting)       Pre-op Assessment    I have reviewed the Patient Summary Reports.    I have reviewed the NPO Status.   I have reviewed the Medications.     Review of Systems  Anesthesia Hx:  No problems with previous Anesthesia                Social:  Former Smoker       Cardiovascular:     Hypertension   CABG/stent (CABG)    CHF    hyperlipidemia   ECG has been reviewed. Cleared by outside cardiology.  She has had a recent heart cath.                             Pulmonary:    Asthma mild                   Hepatic/GI:     GERD, well controlled                Musculoskeletal:  Arthritis          Spine Disorders:  Chronic Pain           Endocrine:  Diabetes, well controlled, type 2         Morbid Obesity / BMI > 40  Psych:  Psychiatric History anxiety depression                Physical Exam  General: Well nourished    Airway:  Mallampati: II   Mouth Opening: Normal  TM Distance: Normal  Tongue: Normal  Neck ROM: Normal ROM    Dental:  Dentures    Chest/Lungs:  Normal Respiratory Rate    Heart:  Rate: Normal  Rhythm: Regular  Rhythm      Anesthesia Plan  Type of Anesthesia, risks & benefits discussed:    Anesthesia Type: Gen ETT  Intra-op Monitoring Plan: Standard ASA Monitors  Post Op Pain Control Plan: multimodal analgesia  Induction:  IV  Airway Plan: , Post-Induction  Informed Consent: Informed consent signed with the Patient and all parties understand the risks and agree with anesthesia plan.  All questions answered.   ASA Score: 3  Day of Surgery Review of History & Physical: H&P Update referred to the surgeon/provider.    Ready For Surgery From Anesthesia Perspective.     .      Chemistry        Component Value Date/Time     01/31/2025 1438    K 4.4 01/31/2025 1438     01/31/2025 1438    CO2 29 01/31/2025 1438    BUN 10 01/31/2025 1438    CREATININE 0.8 01/31/2025 1438     (H) 01/31/2025 1438        Component Value Date/Time    CALCIUM 9.8 01/31/2025 1438    ALKPHOS 74 01/31/2025 1438    AST 25 01/31/2025 1438    ALT 22 01/31/2025 1438    BILITOT 0.5 01/31/2025 1438    ESTGFRAFRICA 84 04/16/2022 0301    ESTGFRAFRICA >60 08/23/2010 1441    EGFRNONAA >60 08/23/2010 1441        Lab Results   Component Value Date    WBC 7.76 01/31/2025    HGB 13.0 01/31/2025    HCT 42.7 01/31/2025    MCV 91 01/31/2025     01/31/2025       Normal sinus rhythm   Cannot rule out Anterior infarct ,age undetermined   Abnormal ECG   When compared with ECG of 23-Aug-2010 14:06,   Vent. rate has decreased by  42 bpm   Confirmed by Leticia Downing ([id number]) on 1/31/2025 5:37:14 PM

## 2025-02-11 NOTE — NURSING TRANSFER
Nursing Transfer Note      2/11/2025   5:32 PM    Nurse giving handoff:PACU,RN   Nurse receiving handoff:Silvia     Reason patient is being transferred: Admit for OBS.    Transfer From: PACU    Transfer via stretcher    Transfer with Belongings    Transported by PACU, RN      Telemetry:  Order for Tele Monitor? No    Patient belongings transferred with patient: Yes    Chart send with patient: No    Notified: Pt. Notified family

## 2025-02-12 VITALS
SYSTOLIC BLOOD PRESSURE: 120 MMHG | HEIGHT: 63 IN | TEMPERATURE: 99 F | BODY MASS INDEX: 46.99 KG/M2 | DIASTOLIC BLOOD PRESSURE: 62 MMHG | OXYGEN SATURATION: 95 % | RESPIRATION RATE: 17 BRPM | WEIGHT: 265.19 LBS | HEART RATE: 100 BPM

## 2025-02-12 LAB
ANION GAP SERPL CALC-SCNC: 10 MMOL/L (ref 8–16)
BASOPHILS # BLD AUTO: 0.01 K/UL (ref 0–0.2)
BASOPHILS NFR BLD: 0.1 % (ref 0–1.9)
BUN SERPL-MCNC: 16 MG/DL (ref 8–23)
CALCIUM SERPL-MCNC: 9.3 MG/DL (ref 8.7–10.5)
CHLORIDE SERPL-SCNC: 105 MMOL/L (ref 95–110)
CO2 SERPL-SCNC: 22 MMOL/L (ref 23–29)
CREAT SERPL-MCNC: 0.8 MG/DL (ref 0.5–1.4)
DIFFERENTIAL METHOD BLD: ABNORMAL
EOSINOPHIL # BLD AUTO: 0 K/UL (ref 0–0.5)
EOSINOPHIL NFR BLD: 0 % (ref 0–8)
ERYTHROCYTE [DISTWIDTH] IN BLOOD BY AUTOMATED COUNT: 14.6 % (ref 11.5–14.5)
EST. GFR  (NO RACE VARIABLE): >60 ML/MIN/1.73 M^2
ESTIMATED AVG GLUCOSE: 123 MG/DL (ref 68–131)
GLUCOSE SERPL-MCNC: 156 MG/DL (ref 70–110)
HBA1C MFR BLD: 5.9 % (ref 4–5.6)
HCT VFR BLD AUTO: 35 % (ref 37–48.5)
HGB BLD-MCNC: 10.7 G/DL (ref 12–16)
IMM GRANULOCYTES # BLD AUTO: 0.06 K/UL (ref 0–0.04)
IMM GRANULOCYTES NFR BLD AUTO: 0.4 % (ref 0–0.5)
LYMPHOCYTES # BLD AUTO: 1 K/UL (ref 1–4.8)
LYMPHOCYTES NFR BLD: 6.9 % (ref 18–48)
MCH RBC QN AUTO: 27.6 PG (ref 27–31)
MCHC RBC AUTO-ENTMCNC: 30.6 G/DL (ref 32–36)
MCV RBC AUTO: 90 FL (ref 82–98)
MONOCYTES # BLD AUTO: 0.9 K/UL (ref 0.3–1)
MONOCYTES NFR BLD: 6.1 % (ref 4–15)
NEUTROPHILS # BLD AUTO: 12.9 K/UL (ref 1.8–7.7)
NEUTROPHILS NFR BLD: 86.5 % (ref 38–73)
NRBC BLD-RTO: 0 /100 WBC
PLATELET # BLD AUTO: 192 K/UL (ref 150–450)
PMV BLD AUTO: 10.6 FL (ref 9.2–12.9)
POCT GLUCOSE: 169 MG/DL (ref 70–110)
POTASSIUM SERPL-SCNC: 4.9 MMOL/L (ref 3.5–5.1)
RBC # BLD AUTO: 3.87 M/UL (ref 4–5.4)
SODIUM SERPL-SCNC: 137 MMOL/L (ref 136–145)
WBC # BLD AUTO: 14.87 K/UL (ref 3.9–12.7)

## 2025-02-12 PROCEDURE — 63600175 PHARM REV CODE 636 W HCPCS: Performed by: INTERNAL MEDICINE

## 2025-02-12 PROCEDURE — 94761 N-INVAS EAR/PLS OXIMETRY MLT: CPT

## 2025-02-12 PROCEDURE — 83036 HEMOGLOBIN GLYCOSYLATED A1C: CPT | Performed by: INTERNAL MEDICINE

## 2025-02-12 PROCEDURE — 97530 THERAPEUTIC ACTIVITIES: CPT

## 2025-02-12 PROCEDURE — 36415 COLL VENOUS BLD VENIPUNCTURE: CPT | Performed by: PHYSICIAN ASSISTANT

## 2025-02-12 PROCEDURE — 25000003 PHARM REV CODE 250: Performed by: INTERNAL MEDICINE

## 2025-02-12 PROCEDURE — 63600175 PHARM REV CODE 636 W HCPCS: Performed by: PHYSICIAN ASSISTANT

## 2025-02-12 PROCEDURE — 80048 BASIC METABOLIC PNL TOTAL CA: CPT | Performed by: PHYSICIAN ASSISTANT

## 2025-02-12 PROCEDURE — 85025 COMPLETE CBC W/AUTO DIFF WBC: CPT | Performed by: PHYSICIAN ASSISTANT

## 2025-02-12 PROCEDURE — 97116 GAIT TRAINING THERAPY: CPT

## 2025-02-12 PROCEDURE — 25000003 PHARM REV CODE 250: Performed by: PHYSICIAN ASSISTANT

## 2025-02-12 PROCEDURE — 97165 OT EVAL LOW COMPLEX 30 MIN: CPT

## 2025-02-12 RX ADMIN — OXYCODONE HYDROCHLORIDE AND ACETAMINOPHEN 1 TABLET: 5; 325 TABLET ORAL at 09:02

## 2025-02-12 RX ADMIN — CHLORHEXIDINE GLUCONATE 0.12% ORAL RINSE 10 ML: 1.2 LIQUID ORAL at 09:02

## 2025-02-12 RX ADMIN — BUSPIRONE HYDROCHLORIDE 2.5 MG: 5 TABLET ORAL at 09:02

## 2025-02-12 RX ADMIN — MORPHINE SULFATE 2 MG: 2 INJECTION, SOLUTION INTRAMUSCULAR; INTRAVENOUS at 01:02

## 2025-02-12 RX ADMIN — DOCUSATE SODIUM 100 MG: 100 CAPSULE, LIQUID FILLED ORAL at 09:02

## 2025-02-12 RX ADMIN — OXYCODONE HYDROCHLORIDE AND ACETAMINOPHEN 1 TABLET: 5; 325 TABLET ORAL at 01:02

## 2025-02-12 RX ADMIN — INSULIN ASPART 2 UNITS: 100 INJECTION, SOLUTION INTRAVENOUS; SUBCUTANEOUS at 11:02

## 2025-02-12 RX ADMIN — PANTOPRAZOLE SODIUM 40 MG: 40 TABLET, DELAYED RELEASE ORAL at 09:02

## 2025-02-12 RX ADMIN — OXYCODONE HYDROCHLORIDE AND ACETAMINOPHEN 1 TABLET: 5; 325 TABLET ORAL at 05:02

## 2025-02-12 RX ADMIN — METOPROLOL TARTRATE 50 MG: 50 TABLET, FILM COATED ORAL at 09:02

## 2025-02-12 NOTE — SUBJECTIVE & OBJECTIVE
Interval History:  Follow up for right hip arthritis.  Patient is sitting up in chair doing well this morning.  Patient's only concern is being home by herself post surgery.  Patient ambulated 20 ft with rolling walker and standby assist.  Her pain is much better than prior to surgery.    Review of Systems  Objective:     Vital Signs (Most Recent):  Temp: 98.9 °F (37.2 °C) (02/12/25 0747)  Pulse: 100 (02/12/25 0801)  Resp: 17 (02/12/25 1324)  BP: 120/62 (02/12/25 0747)  SpO2: 95 % (02/12/25 0801) Vital Signs (24h Range):  Temp:  [98.1 °F (36.7 °C)-98.9 °F (37.2 °C)] 98.9 °F (37.2 °C)  Pulse:  [] 100  Resp:  [16-80] 17  SpO2:  [92 %-96 %] 95 %  BP: (112-126)/(62-71) 120/62     Weight: 120.3 kg (265 lb 3.4 oz)  Body mass index is 46.98 kg/m².    Intake/Output Summary (Last 24 hours) at 2/12/2025 1519  Last data filed at 2/11/2025 2000  Gross per 24 hour   Intake --   Output 1 ml   Net -1 ml         Physical Exam  Vitals and nursing note reviewed.   Constitutional:       Appearance: Normal appearance.   HENT:      Head: Normocephalic and atraumatic.      Nose: Nose normal.      Mouth/Throat:      Mouth: Mucous membranes are moist.   Eyes:      Extraocular Movements: Extraocular movements intact.      Conjunctiva/sclera: Conjunctivae normal.   Cardiovascular:      Rate and Rhythm: Normal rate and regular rhythm.      Pulses: Normal pulses.      Heart sounds: Normal heart sounds.   Pulmonary:      Effort: Pulmonary effort is normal.      Breath sounds: Normal breath sounds.   Abdominal:      General: Abdomen is flat. Bowel sounds are normal.      Palpations: Abdomen is soft.   Musculoskeletal:         General: Tenderness and signs of injury present. Normal range of motion.   Skin:     General: Skin is warm.      Capillary Refill: Capillary refill takes less than 2 seconds.   Neurological:      Mental Status: She is alert and oriented to person, place, and time. Mental status is at baseline.   Psychiatric:          Mood and Affect: Mood normal.         Behavior: Behavior normal.             Significant Labs: All pertinent labs within the past 24 hours have been reviewed.  Recent Lab Results         02/12/25  1113   02/12/25  0408   02/12/25  0010        Anion Gap     10       Baso #   0.01         Basophil %   0.1         BUN     16       Calcium     9.3       Chloride     105       CO2     22       Creatinine     0.8       Differential Method   Automated         eGFR     >60       Eos #   0.0         Eos %   0.0         Estimated Avg Glucose   123         Glucose     156       Gran # (ANC)   12.9         Gran %   86.5         Hematocrit   35.0         Hemoglobin   10.7         Hemoglobin A1C External   5.9  Comment: ADA Screening Guidelines:  5.7-6.4%  Consistent with prediabetes  >or=6.5%  Consistent with diabetes    High levels of fetal hemoglobin interfere with the HbA1C  assay. Heterozygous hemoglobin variants (HbS, HgC, etc)do  not significantly interfere with this assay.   However, presence of multiple variants may affect accuracy.           Immature Grans (Abs)   0.06  Comment: Mild elevation in immature granulocytes is non specific and   can be seen in a variety of conditions including stress response,   acute inflammation, trauma and pregnancy. Correlation with other   laboratory and clinical findings is essential.           Immature Granulocytes   0.4         Lymph #   1.0         Lymph %   6.9         MCH   27.6         MCHC   30.6         MCV   90         Mono #   0.9         Mono %   6.1         MPV   10.6         nRBC   0         Platelet Count   192         POCT Glucose 169           Potassium     4.9       RBC   3.87         RDW   14.6         Sodium     137       WBC   14.87                 Significant Imaging:   X-Ray Hip with Pelvis when performed, 2 or 3 views Right   Final Result      1.  Postoperative changes to the right hip and surrounding soft tissues as detailed above.      2.  Increased stool  burden.  Constipation symptoms?         Electronically signed by: Benja Flores MD   Date:    02/11/2025   Time:    12:01

## 2025-02-12 NOTE — PT/OT/SLP PROGRESS
"Physical Therapy Treatment    Patient Name:  Sandra Sanders   MRN:  [id number]    Recommendations:     Discharge Recommendations: Low Intensity Therapy  Discharge Equipment Recommendations: none  Barriers to discharge: None-PT VOICED CONCERNS ABOUT GOING HOME ALONE BUT PER EVAL PT HAS GOOD HELP FROM EVERYDAY SITTERS AND FAMILY    Assessment:     Sandra Sanders is a 69 y.o. female admitted with a medical diagnosis of Arthritis of right hip.  She presents with the following impairments/functional limitations: weakness, impaired endurance, impaired functional mobility, gait instability, impaired balance, pain, decreased safety awareness, decreased coordination, decreased lower extremity function.    Rehab Prognosis: Good; patient would benefit from acute skilled PT services to address these deficits and reach maximum level of function.    Recent Surgery: Procedure(s) (LRB):  ARTHROPLASTY, HIP (Right) 1 Day Post-Op    Plan:     During this hospitalization, patient to be seen 3 x/week to address the identified rehab impairments via gait training, therapeutic activities, therapeutic exercises and progress toward the following goals:    Plan of Care Expires:  02/25/25    Subjective     Chief Complaint: "I DON'T WANT TO GO HOME ALONE"  Patient/Family Comments/goals: "I WANT TO CALL MY INSURANCE TO SEE IF I CAN STAY AT THE NURSING HOME"  Pain/Comfort:  Pain Rating 1: 7/10  Location - Side 1: Right  Location - Orientation 1: generalized  Location 1: hip  Pain Addressed 1: Reposition      Objective:     Communicated with NURSE MORGAN prior to session.  Patient found supine with peripheral IV upon PT entry to room.     General Precautions: Standard, fall  Orthopedic Precautions: RLE weight bearing as tolerated, RLE posterior precautions  Braces: Knee immobilizer (FOR BED USE ONLY)  Respiratory Status: Room air     Functional Mobility:  Bed Mobility:     Rolling Left:  stand by assistance  Scooting: stand by " "assistance  Supine to Sit: contact guard assistance-ASSIST WITH R LEG LIFT  Transfers:     Sit to Stand:  stand by assistance with rolling walker  Bed to Chair: stand by assistance with  rolling walker  using  Step Transfer  Gait: PT AMB 20' WITH RW AND SBA, SLOW PACE, STEP TO WITH PROGRESS TO STEP THROUGH GAIT PATTERN, C/O QUICK TO FATIGUE, DECLINED FURTHER DISTANCE DESPITE ENCOURAGEMENT TO CONTINUE, "I AM A HEART PATIENT, I CAN'T".  NO LOB OR SOB ON ROOM AIR, CUES FOR UPRIGHT POSTURE AND RW SAFETY  Balance: GOOD SITTING BALANCE, FAIR+ DYNAMIC BALANCE DURING GAIT  PT EDUCATED IN WBING PRECAUTION FOR RLE AS WELL AS POSTERIOR HIP PRECAUTIONS  PT EDUCATED IN USE OF KNEE IMMOB. IN BED ONLY  PT EDUCATED IN AND PERFORMED SEATED RLE THEREX X 10 REPS: AP'S, LAQ, QUAD SET, PT REPORTS UNABLE TO TOLERATE HIP FLEX/EXT    AM-PAC 6 CLICK MOBILITY  Turning over in bed (including adjusting bedclothes, sheets and blankets)?: 4  Sitting down on and standing up from a chair with arms (e.g., wheelchair, bedside commode, etc.): 4  Moving from lying on back to sitting on the side of the bed?: 3  Moving to and from a bed to a chair (including a wheelchair)?: 4  Need to walk in hospital room?: 4  Climbing 3-5 steps with a railing?: 1  Basic Mobility Total Score: 20     Treatment & Education:  PT EDUCATION:  - ROLE OF P.T. AND POC IN ACUTE CARE HOSPITAL SETTING  - RW USE AND SAFETY DURING TF'S AND GAIT  - ENCOURAGED TO INCREASE TIME OOB IN CHAIR TO TOLERANCE   - TO CONTINUE THERAPUETIC EXERCISES THROUGHOUT THE DAY TO INCREASE ACTIVITY TOLERANCE AND DECREASE RISK FOR PNEUMONIA AND BLOOD CLOTS: HIP FLEX/EXT, HIP ABD/ADD, QUAD SET, HEEL SLIDE, AP  - RISK FOR FALLS DUE TO GENERALIZED WEAKNESS, EDUCATED ON "CALL DON'T FALL", ENCOURAGED TO CALL FOR ASSISTANCE WITH ALL NEEDS SUCH AS BED<>CHAIR TRANSFERS OR TRIPS TO BATHROOM, PT AGREEABLE TO SAFETY PRECAUTIONS    Patient left up in chair with all lines intact, call button in reach, chair alarm on, " and NURSE notified..    GOALS:   Multidisciplinary Problems       Physical Therapy Goals       Not on file              Multidisciplinary Problems (Resolved)          Problem: Physical Therapy    Goal Priority Disciplines Outcome Interventions   Physical Therapy Goal   (Resolved)     PT, PT/OT Met    Description: Goals to be met by 2/25/25.  1. Pt will complete bed mobility MOD I.  2. Pt will complete sit to stand MOD I.  3. Pt will ambulate 200ft MOD I using RW.  4. Pt will increase AMPAC score by 2 points to progress functional mobility.                       DME Justifications:  No DME recommended requiring DME justifications    Time Tracking:     PT Received On: 02/12/25  PT Start Time: 0815     PT Stop Time: 0840  PT Total Time (min): 25 min     Billable Minutes: Gait Training 15 and Therapeutic Activity 10    Treatment Type: Treatment  PT/PTA: PT     Number of PTA visits since last PT visit: 0     02/12/2025

## 2025-02-12 NOTE — PLAN OF CARE
OT evaluation completed  Bed mobility CGA  Seated scoot SBA  Sit to stand with RW SBA  Ambulated with RW SBA x20 feet  Declined to ambulate further  Transfer to bedside chair SBA    Recommend low intensity therapy at MD

## 2025-02-12 NOTE — CONSULTS
[facility] ([facility])  [facility] Medicine  Consult Note    Patient Name: Sandra Sanders  MRN: [id number]  Admission Date: 2/11/2025  Hospital Length of Stay: 0 days  Attending Physician: Jose R Rmaon MD   Primary Care Provider: Heber Tuttle MD           Patient information was obtained from patient and past medical records.     Inpatient consult to Hospitalist  Consult performed by: Martha Smith MD  Consult ordered by: Astrid Meyers PA        Subjective:     Principal Problem: Arthritis of right hip    Chief Complaint: No chief complaint on file.       HPI: Pt is a 70 YO  female with PMH notable for T2DM, CAD s/p CABG, chronic HFpEF, RA who is admitted to the orthopedics surgery service. Underwent R BORIS with Dr. Ramon on 02/11/2025, hospital medicine consulted postoperatively for medical management. Pt seen and examined, resting comfortably. Reports some stiffness to R hip, pain controlled with current meds. Denies numbness/tingling to RLE. Denies CP, SOB. Does note some BLE edema, is off lasix. Denies nausea, vomiting, abd pain or difficulty urinating. Prior to surgery was in her usual state of health, reports has been off methotrexate for 7-8 days. Her primary cardiologist is Dr. Rony Khanna     Past Medical History:   Diagnosis Date    Anxiety     Asthma     CHF (congestive heart failure)     Diabetes mellitus, type 2     GERD (gastroesophageal reflux disease)     Hyperlipidemia        Past Surgical History:   Procedure Laterality Date    BYPASS GRAFT  08/06/2013    CHOLECYSTECTOMY  2023    CYST REMOVAL Right 1995    HIP SURGERY Left     2009    HYSTERECTOMY  2015    LAPAROSCOPIC SLEEVE GASTRECTOMY  2020    LEFT HEART CATHETERIZATION  01/17/2025    MULTIPLE TOOTH EXTRACTIONS  03/2015    TUBAL LIGATION Bilateral 1982    [facility]       Review of patient's allergies indicates:   Allergen Reactions    Insulin lispro     Insulin regular human      Metformin     Nsaids (non-steroidal anti-inflammatory drug)     Statins-hmg-coa reductase inhibitors        No current facility-administered medications on file prior to encounter.     Current Outpatient Medications on File Prior to Encounter   Medication Sig    acetaminophen (TYLENOL) 500 MG tablet Take 1,000 mg by mouth every 6 (six) hours as needed for Pain.    metoprolol tartrate (LOPRESSOR) 50 MG tablet     ALPRAZolam (XANAX) 0.5 MG tablet SMARTSI Tablet(s) By Mouth Morning-Evening    ALPRAZolam (XANAX) 1 MG tablet     blood sugar diagnostic (TRUE METRIX GLUCOSE TEST STRIP) Strp USE AS DIRECTED FOR GLUCOSE MONITORING THREE TIMES DAILY    blood-glucose meter (TRUE METRIX GLUCOSE METER) Misc USE AS DIRECTED TO CHECK GLUCOSE TESTING THREE TIMES DAILY    busPIRone (BUSPAR) 7.5 MG tablet Take 7.5 mg by mouth once daily.    cetirizine (ZYRTEC) 10 MG tablet Take 1 tablet by mouth once daily.    cholecalciferol, vitamin D3, (DECARA) 50,000 unit capsule     clopidogreL (PLAVIX) 75 mg tablet Take 75 mg by mouth once daily.    dexlansoprazole (DEXILANT) 60 mg capsule Take 60 mg by mouth once daily.    dicyclomine (BENTYL) 20 mg tablet Take 20 mg by mouth as needed.    docusate sodium (COLACE) 100 MG capsule Take 1 capsule by mouth 2 (two) times daily as needed.    fluconazole (DIFLUCAN) 150 MG Tab Take 150 mg by mouth as needed.    fluticasone (FLONASE) 50 mcg/actuation nasal spray     folic acid (FOLVITE) 1 MG tablet Take 1 tablet by mouth every morning.    furosemide (LASIX) 40 MG tablet Take 1 tablet by mouth 2 (two) times daily.    gabapentin (NEURONTIN) 100 MG capsule Take 1 capsule by mouth 2 (two) times daily.    guaiFENesin (MUCINEX) 600 mg 12 hr tablet Take 600 mg by mouth as needed.    insulin aspart U-100 (NOVOLOG FLEXPEN U-100 INSULIN) 100 unit/mL InPn pen     insulin detemir U-100 (LEVEMIR FLEXTOUCH U-100 INSULN) 100 unit/mL (3 mL) SubQ InPn pen     Lactobac. rhamnosus GG-inulin 10 billion cell -200 mg  CpSP Take by mouth once daily.    levocetirizine (XYZAL) 5 MG tablet     LIVALO 2 mg Tab tablet     methotrexate 2.5 MG Tab Take 8 tablets by mouth once a week.    MOUNJARO 7.5 mg/0.5 mL PnIj INJECT 7.5MG UNDER THE SKIN EVERY 7 DAYS    MOVANTIK 12.5 mg Tab Take 1 tablet by mouth every morning.    mupirocin (BACTROBAN) 2 % ointment Apply topically 3 (three) times daily.    nitrofurantoin, macrocrystal-monohydrate, (MACROBID) 100 MG capsule Take 100 mg by mouth 2 (two) times daily.    nitroGLYCERIN (NITROSTAT) 0.4 MG SL tablet Place 0.4 mg under the tongue.    ondansetron (ZOFRAN-ODT) 8 MG TbDL Take 8 mg by mouth as needed.    phenoL-glycerin (CHLORASEPTIC MAX SORE THROAT) 1.5-33 % [place] SPRAY  .    predniSONE (DELTASONE) 5 MG tablet Take 5 mg by mouth as needed.    promethazine (PHENERGAN) 25 MG tablet     QVAR 40 mcg/actuation Aero INHALE 2 PUFFS BY MOUTH TWICE DAILY    ROXICODONE 30 mg Tab     TRINTELLIX 10 mg Tab Take 1 tablet by mouth once daily.     Family History    None       Tobacco Use    Smoking status: Former    Smokeless tobacco: Never   Substance and Sexual Activity    Alcohol use: Not Currently    Drug use: Never    Sexual activity: Not on file     Review of Systems   Constitutional:  Negative for activity change, appetite change, fatigue and fever.   HENT:  Negative for congestion, rhinorrhea and sore throat.    Respiratory:  Negative for cough, chest tightness and shortness of breath.    Cardiovascular:  Positive for leg swelling. Negative for chest pain and palpitations.   Gastrointestinal:  Negative for abdominal pain, constipation, diarrhea, nausea and vomiting.   Genitourinary:  Negative for difficulty urinating, dysuria and hematuria.   Musculoskeletal:  Positive for arthralgias.   Skin:  Negative for rash.   Allergic/Immunologic: Negative.    Neurological:  Negative for dizziness, light-headedness and numbness.     Objective:     Vital Signs (Most Recent):  Temp: 98.8 °F (37.1 °C) (02/11/25  "2001)  Pulse: (!) 111 (02/11/25 2001)  Resp: (!) 80 (02/11/25 2001)  BP: 124/71 (02/11/25 2001)  SpO2: 96 % (02/11/25 2001) Vital Signs (24h Range):  Temp:  [98 °F (36.7 °C)-98.8 °F (37.1 °C)] 98.8 °F (37.1 °C)  Pulse:  [] 111  Resp:  [8-80] 80  SpO2:  [88 %-100 %] 96 %  BP: (101-143)/(60-80) 124/71     Weight: 119.6 kg (263 lb 10.7 oz)  Body mass index is 46.71 kg/m².     Physical Exam  Vitals and nursing note reviewed.   Constitutional:       General: She is not in acute distress.     Appearance: She is obese. She is not ill-appearing.   Cardiovascular:      Rate and Rhythm: Regular rhythm. Tachycardia present.      Heart sounds: No murmur heard.  Pulmonary:      Effort: Pulmonary effort is normal.      Breath sounds: Normal breath sounds. No wheezing, rhonchi or rales.      Comments: On room air   Abdominal:      General: Bowel sounds are normal. There is no distension.      Palpations: Abdomen is soft.      Tenderness: There is no abdominal tenderness.   Musculoskeletal:      Right lower leg: Edema present.      Left lower leg: Edema present.      Comments: RLE knee immobilizer, TOM dressing in place, able to wiggle toes    Neurological:      Mental Status: She is alert and oriented to person, place, and time.          Significant Labs: All pertinent labs within the past 24 hours have been reviewed.    Significant Imaging: I have reviewed all pertinent imaging results/findings within the past 24 hours.  Assessment/Plan:     * Arthritis of right hip  - s/p R BORIS Dr. Ramon 02/11  - management per primary team   - encourage OOB and IS as able   - DVT ppx per primary team       Type 2 diabetes mellitus  Patient's FSGs are controlled on current medication regimen.  Last A1c reviewed-   Lab Results   Component Value Date    HGBA1C 7.2 (H) 04/15/2022     Most recent fingerstick glucose reviewed- No results for input(s): "POCTGLUCOSE" in the last 24 hours.  Current correctional scale  Medium  Maintain " "anti-hyperglycemic dose as follows-   Antihyperglycemics (From admission, onward)      Start     Stop Route Frequency Ordered    02/11/25 2350  insulin aspart U-100 pen 0-10 Units         -- SubQ Before meals & nightly PRN 02/11/25 2252        Reports she only takes once weekly Mounjaro as outpatient   Start mod dose SSI inpatient   Monitor BG; hypoglycemia protocol   A1c with AM labs   Hold Oral hypoglycemics while patient is in the hospital.    CAD (coronary artery disease)  Patient with known CAD s/p CABG, which is controlled. No CP, SOB at this time   Followed by Dr. Khanna as outpatient, pt reports recent LHC as outpatient which was normal   Resume home  Plavix once cleared from surgical standpoint to do so   Cont B blocker       CHF (congestive heart failure)  Patient has Diastolic (HFpEF) heart failure that is Chronic. On presentation their CHF was well compensated. Most recent BNP and echo results are listed below.  No results for input(s): "BNP" in the last 72 hours.  Latest ECHO  No results found for this or any previous visit.    Current Heart Failure Medications       Plan  - Monitor strict I&Os and daily weights.    - Place on telemetry  - Low sodium diet  - Place on fluid restriction of 1.5 L.   - Cardiology has not been consulted  - The patient's volume status is at their baseline  - stop IV fluids, resume home PO lasix in AM if BP remains stable   - resume home B blocker           VTE Risk Mitigation (From admission, onward)           Ordered     IP VTE HIGH RISK PATIENT  Once         02/11/25 1716     Place CHAD hose  Until discontinued         02/11/25 0815     Place sequential compression device  Until discontinued         02/11/25 0815                        Thank you for your consult. I will follow-up with patient. Please contact us if you have any additional questions.    Martha Smith MD  Department of Hospital Medicine   [facility] - Telemetry (Hospital)      "

## 2025-02-12 NOTE — ASSESSMENT & PLAN NOTE
"Patient has Diastolic (HFpEF) heart failure that is Chronic. On presentation their CHF was well compensated. Most recent BNP and echo results are listed below.  No results for input(s): "BNP" in the last 72 hours.  Latest ECHO  No results found for this or any previous visit.    Current Heart Failure Medications       Plan  - Monitor strict I&Os and daily weights.    - Place on telemetry  - Low sodium diet  - Place on fluid restriction of 1.5 L.   - Cardiology has not been consulted  - The patient's volume status is at their baseline  - stop IV fluids, resume home PO lasix in AM if BP remains stable   - resume home B blocker       "

## 2025-02-12 NOTE — ASSESSMENT & PLAN NOTE
Patient with known CAD s/p CABG, which is controlled. No CP, SOB at this time   Followed by Dr. Khanna as outpatient, pt reports recent LHC as outpatient which was normal   Resume home  Plavix once cleared from surgical standpoint to do so   Cont B blocker

## 2025-02-12 NOTE — PT/OT/SLP EVAL
"Occupational Therapy   Evaluation & Treatment    Name: Sandra Sanders  MRN: [id number]  Admitting Diagnosis: Arthritis of right hip  Recent Surgery: Procedure(s) (LRB):  ARTHROPLASTY, HIP (Right) 1 Day Post-Op    Recommendations:     Discharge Recommendations: Low Intensity Therapy  Discharge Equipment Recommendations:  bath bench  Barriers to discharge:       Assessment:     Sandra Sanders is a 69 y.o. female with a medical diagnosis of Arthritis of right hip. Performance deficits affecting function: decreased upper extremity function, gait instability, weakness, impaired endurance, impaired balance, decreased lower extremity function, decreased safety awareness, pain, impaired self care skills, orthopedic precautions, impaired functional mobility.      Rehab Prognosis: Good; patient would benefit from acute skilled OT services to address these deficits and reach maximum level of function.       Plan:     Patient to be seen 2 x/week to address the above listed problems via self-care/home management, therapeutic activities, therapeutic exercises  Plan of Care Expires: 02/26/25  Plan of Care Reviewed with: patient    Subjective     Chief Complaint: Pt states "I do not want to go home and be alone."  Patient/Family Comments/goals: Increase independence    Occupational Profile:  Living Environment: Lives alone in [facility] with ramp entrance  Previous level of function: Mod I with self-care and functional mobility with rollator household distances  Roles and Routines: Drives, utilizes wheelchair in community  Equipment Used at Home: rollator, walker, yi, walker, rolling, bedside commode (scooter  chair)  Assistance upon Discharge: Pt states she has sitter 7 days per week 9-3, and has family assistance    Pain/Comfort:  Pain Rating 1: 7/10  Location - Side 1: Right  Location - Orientation 1: generalized  Location 1: hip  Pain Addressed 1: Reposition  Pain Rating Post-Intervention 1: 6/10    Patients cultural, " spiritual, Quaker conflicts given the current situation:      Objective:     Communicated with: Nurse prior to session.  Patient found supine with telemetry, peripheral IV, knee immobilizer upon OT entry to room.    General Precautions: Standard, fall  Orthopedic Precautions: RLE weight bearing as tolerated, RLE posterior precautions  Braces: Knee immobilizer (in bed only)  Respiratory Status: Room air    Occupational Performance:    Bed Mobility:    Patient completed Rolling/Turning to Left with  contact guard assistance  Patient completed Scooting/Bridging with stand by assistance  Patient completed Supine to Sit with contact guard assistance    Functional Mobility/Transfers:  Patient completed Sit <> Stand Transfer with stand by assistance  with  rolling walker   Patient completed Bed <> Chair Transfer using Stand Pivot technique with stand by assistance with rolling walker  Functional Mobility: Pt ambulated with RW SBA x20 feet, declining to ambulate further     Cognitive/Visual Perceptual:  Cognitive/Psychosocial Skills:     -       Oriented to: Person, Place, Time, and Situation   -       Follows Commands/attention:Follows multistep  commands  -       Memory: No Deficits noted  -       Safety awareness/insight to disability: impaired     Physical Exam:  Balance:    -       fair+  Upper Extremity Range of Motion:     -       Right Upper Extremity: WFL  -       Left Upper Extremity: WFL  Upper Extremity Strength:    -       Right Upper Extremity: grossly 3+/5  -       Left Upper Extremity: grossly 3+/5    AMPAC 6 Click ADL:  AMPAC Total Score: 20    Treatment & Education:  OT evaluation completed to assess patient's current level of function. Pt presents with the above stated deficits, limiting her ability to complete independent self-care activities and functional mobility. Pt will benefit from skilled OT services to address the above stated deficits, in order to increase functional independence and  "safety.  Pt transferred to bedside chair with SBA with RW. Pt adamantly stating she is "not ready to go home alone" despite stating she has caregivers and family available to help. Pt educated on level of assistance and recommendations for home health. Pt eventually agreed to disagree. Pt left with all needs met, reviewed posterior hip precautions, and needs within reach.    Patient left up in chair with all lines intact, call button in reach, chair alarm on, and nurse notified    GOALS:   Multidisciplinary Problems       Occupational Therapy Goals          Problem: Occupational Therapy    Goal Priority Disciplines Outcome Interventions   Occupational Therapy Goal     OT, PT/OT     Description: Goals to be met by: 2/26/25     Patient will increase functional independence with ADLs by performing:    Toileting from toilet with Contact Guard Assistance for hygiene and clothing management.   Toilet transfer to toilet with Contact Guard Assistance.  Upper extremity exercise program x10 reps per handout, with supervision.                           History:     Past Medical History:   Diagnosis Date    Anxiety     Asthma     CHF (congestive heart failure)     Diabetes mellitus, type 2     GERD (gastroesophageal reflux disease)     Hyperlipidemia          Past Surgical History:   Procedure Laterality Date    BYPASS GRAFT  08/06/2013    CHOLECYSTECTOMY  2023    CYST REMOVAL Right 1995    HIP SURGERY Left     2009    HYSTERECTOMY  2015    LAPAROSCOPIC SLEEVE GASTRECTOMY  2020    LEFT HEART CATHETERIZATION  01/17/2025    MULTIPLE TOOTH EXTRACTIONS  03/2015    TUBAL LIGATION Bilateral [address] [place] [address]       Time Tracking:     OT Date of Treatment: 02/12/25  OT Start Time: 0800  OT Stop Time: 0825  OT Total Time (min): 25 min    Billable Minutes:Evaluation 15  Therapeutic Activity 10    PRASHANT Gonzalez  2/12/2025  "

## 2025-02-12 NOTE — PROGRESS NOTES
[facility] - Telemetry ([facility])  [facility] Medicine  Progress Note    Patient Name: Sandra Sanders  MRN: [id number]  Patient Class: OP- Outpatient Recovery   Admission Date: 2/11/2025  Length of Stay: 0 days  Attending Physician: Jose R Ramon MD  Primary Care Provider: Heber Tuttle MD        Subjective     Principal Problem:Arthritis of right hip        HPI:  Pt is a 68 YO  female with PMH notable for T2DM, CAD s/p CABG, chronic HFpEF, RA who is admitted to the orthopedics surgery service. Underwent R BORIS with Dr. Ramon on 02/11/2025, hospital medicine consulted postoperatively for medical management. Pt seen and examined, resting comfortably. Reports some stiffness to R hip, pain controlled with current meds. Denies numbness/tingling to RLE. Denies CP, SOB. Does note some BLE edema, is off lasix. Denies nausea, vomiting, abd pain or difficulty urinating. Prior to surgery was in her usual state of health, reports has been off methotrexate for 7-8 days. Her primary cardiologist is Dr. Rony Khanna     Overview/Hospital Course:  No notes on file    Interval History:  Follow up for right hip arthritis.  Patient is sitting up in chair doing well this morning.  Patient's only concern is being home by herself post surgery.  Patient ambulated 20 ft with rolling walker and standby assist.  Her pain is much better than prior to surgery.    Review of Systems  Objective:     Vital Signs (Most Recent):  Temp: 98.9 °F (37.2 °C) (02/12/25 0747)  Pulse: 100 (02/12/25 0801)  Resp: 17 (02/12/25 1324)  BP: 120/62 (02/12/25 0747)  SpO2: 95 % (02/12/25 0801) Vital Signs (24h Range):  Temp:  [98.1 °F (36.7 °C)-98.9 °F (37.2 °C)] 98.9 °F (37.2 °C)  Pulse:  [] 100  Resp:  [16-80] 17  SpO2:  [92 %-96 %] 95 %  BP: (112-126)/(62-71) 120/62     Weight: 120.3 kg (265 lb 3.4 oz)  Body mass index is 46.98 kg/m².    Intake/Output Summary (Last 24 hours) at 2/12/2025 1519  Last data filed at 2/11/2025  2000  Gross per 24 hour   Intake --   Output 1 ml   Net -1 ml         Physical Exam  Vitals and nursing note reviewed.   Constitutional:       Appearance: Normal appearance.   HENT:      Head: Normocephalic and atraumatic.      Nose: Nose normal.      Mouth/Throat:      Mouth: Mucous membranes are moist.   Eyes:      Extraocular Movements: Extraocular movements intact.      Conjunctiva/sclera: Conjunctivae normal.   Cardiovascular:      Rate and Rhythm: Normal rate and regular rhythm.      Pulses: Normal pulses.      Heart sounds: Normal heart sounds.   Pulmonary:      Effort: Pulmonary effort is normal.      Breath sounds: Normal breath sounds.   Abdominal:      General: Abdomen is flat. Bowel sounds are normal.      Palpations: Abdomen is soft.   Musculoskeletal:         General: Tenderness and signs of injury present. Normal range of motion.   Skin:     General: Skin is warm.      Capillary Refill: Capillary refill takes less than 2 seconds.   Neurological:      Mental Status: She is alert and oriented to person, place, and time. Mental status is at baseline.   Psychiatric:         Mood and Affect: Mood normal.         Behavior: Behavior normal.             Significant Labs: All pertinent labs within the past 24 hours have been reviewed.  Recent Lab Results         02/12/25  1113   02/12/25  0408   02/12/25  0010        Anion Gap     10       Baso #   0.01         Basophil %   0.1         BUN     16       Calcium     9.3       Chloride     105       CO2     22       Creatinine     0.8       Differential Method   Automated         eGFR     >60       Eos #   0.0         Eos %   0.0         Estimated Avg Glucose   123         Glucose     156       Gran # (ANC)   12.9         Gran %   86.5         Hematocrit   35.0         Hemoglobin   10.7         Hemoglobin A1C External   5.9  Comment: ADA Screening Guidelines:  5.7-6.4%  Consistent with prediabetes  >or=6.5%  Consistent with diabetes    High levels of fetal  "hemoglobin interfere with the HbA1C  assay. Heterozygous hemoglobin variants (HbS, HgC, etc)do  not significantly interfere with this assay.   However, presence of multiple variants may affect accuracy.           Immature Grans (Abs)   0.06  Comment: Mild elevation in immature granulocytes is non specific and   can be seen in a variety of conditions including stress response,   acute inflammation, trauma and pregnancy. Correlation with other   laboratory and clinical findings is essential.           Immature Granulocytes   0.4         Lymph #   1.0         Lymph %   6.9         MCH   27.6         MCHC   30.6         MCV   90         Mono #   0.9         Mono %   6.1         MPV   10.6         nRBC   0         Platelet Count   192         POCT Glucose 169           Potassium     4.9       RBC   3.87         RDW   14.6         Sodium     137       WBC   14.87                 Significant Imaging:   X-Ray Hip with Pelvis when performed, 2 or 3 views Right   Final Result      1.  Postoperative changes to the right hip and surrounding soft tissues as detailed above.      2.  Increased stool burden.  Constipation symptoms?         Electronically signed by: Benja Flores MD   Date:    02/11/2025   Time:    12:01           Assessment and Plan     * Arthritis of right hip  - s/p R BORIS Dr. Ramon 02/11  - management per primary team  - DVT ppx per primary team    - encourage OOB and IS as able   - PT/OT - home with HH      Type 2 diabetes mellitus  Patient's FSGs are controlled on current medication regimen.  Last A1c reviewed-   Lab Results   Component Value Date    HGBA1C 7.2 (H) 04/15/2022     Most recent fingerstick glucose reviewed- No results for input(s): "POCTGLUCOSE" in the last 24 hours.  Current correctional scale  Medium  Maintain anti-hyperglycemic dose as follows-   Antihyperglycemics (From admission, onward)      Start     Stop Route Frequency Ordered    02/11/25 2350  insulin aspart U-100 pen 0-10 Units      " "   -- SubQ Before meals & nightly PRN 02/11/25 0054        Reports she only takes once weekly Mounjaro as outpatient   Start mod dose SSI inpatient   Monitor BG; hypoglycemia protocol   A1c with AM labs   Hold Oral hypoglycemics while patient is in the hospital.    CAD (coronary artery disease)  Patient with known CAD s/p CABG, which is controlled. No CP, SOB at this time   Followed by Dr. Khanna as outpatient, pt reports recent LHC as outpatient which was normal   Resume home  Plavix once cleared from surgical standpoint to do so   Cont B blocker       CHF (congestive heart failure)  Patient has Diastolic (HFpEF) heart failure that is Chronic. On presentation their CHF was well compensated. Most recent BNP and echo results are listed below.  No results for input(s): "BNP" in the last 72 hours.  Latest ECHO  No results found for this or any previous visit.    Current Heart Failure Medications       Plan  - Monitor strict I&Os and daily weights.    - Place on telemetry  - Low sodium diet  - Place on fluid restriction of 1.5 L.   - Cardiology has not been consulted  - The patient's volume status is at their baseline  - stop IV fluids, resume home PO lasix in AM if BP remains stable   - resume home B blocker           VTE Risk Mitigation (From admission, onward)           Ordered     IP VTE HIGH RISK PATIENT  Once         02/11/25 1716     Place CHAD hose  Until discontinued         02/11/25 0815     Place sequential compression device  Until discontinued         02/11/25 0815                    Discharge Planning   KYAW: 2/12/2025     Code Status: Not on file   Medical Readiness for Discharge Date: 2/11/2025  Discharge Plan A: Home with family, Home Health                Please place Justification for DME        Anju Ordonez MD  Department of Hospital Medicine   [facility] - Telemetry (Hospital)    "

## 2025-02-12 NOTE — PLAN OF CARE
Problem: Adult Inpatient Plan of Care  Goal: Plan of Care Review  Outcome: Progressing  Goal: Patient-Specific Goal (Individualized)  Outcome: Progressing  Goal: Absence of Hospital-Acquired Illness or Injury  Outcome: Progressing  Goal: Optimal Comfort and Wellbeing  Outcome: Progressing  Goal: Readiness for Transition of Care  Outcome: Progressing     Problem: Diabetes Comorbidity  Goal: Blood Glucose Level Within Targeted Range  Outcome: Progressing     Problem: Bariatric Environmental Safety  Goal: Safety Maintained with Care  Outcome: Progressing     Problem: Wound  Goal: Optimal Coping  Outcome: Progressing  Goal: Optimal Functional Ability  Outcome: Progressing  Goal: Absence of Infection Signs and Symptoms  Outcome: Progressing  Goal: Improved Oral Intake  Outcome: Progressing  Goal: Optimal Pain Control and Function  Outcome: Progressing  Goal: Skin Health and Integrity  Outcome: Progressing  Goal: Optimal Wound Healing  Outcome: Progressing     Problem: Pain Acute  Goal: Optimal Pain Control and Function  Outcome: Progressing     Problem: Surgery Nonspecified  Goal: Absence of Bleeding  Outcome: Progressing  Goal: Effective Bowel Elimination  Outcome: Progressing  Goal: Fluid and Electrolyte Balance  Outcome: Progressing  Goal: Blood Glucose Level Within Targeted Range  Outcome: Progressing  Goal: Absence of Infection Signs and Symptoms  Outcome: Progressing  Goal: Anesthesia/Sedation Recovery  Outcome: Progressing  Goal: Optimal Pain Control and Function  Outcome: Progressing  Goal: Nausea and Vomiting Relief  Outcome: Progressing  Goal: Effective Urinary Elimination  Outcome: Progressing  Goal: Effective Oxygenation and Ventilation  Outcome: Progressing     Problem: Fall Injury Risk  Goal: Absence of Fall and Fall-Related Injury  Outcome: Progressing

## 2025-02-12 NOTE — ASSESSMENT & PLAN NOTE
POD1- R BORIS    Farrukh to [facility]IL[facility] home with home health services   Patient to wear knee immobilizer at night for 2 weeks   Patient will follow up in clinic in 2 weeks for further evaluation   She may call the clinic for any questions or concerns

## 2025-02-12 NOTE — ASSESSMENT & PLAN NOTE
"Patient's FSGs are controlled on current medication regimen.  Last A1c reviewed-   Lab Results   Component Value Date    HGBA1C 7.2 (H) 04/15/2022     Most recent fingerstick glucose reviewed- No results for input(s): "POCTGLUCOSE" in the last 24 hours.  Current correctional scale  Medium  Maintain anti-hyperglycemic dose as follows-   Antihyperglycemics (From admission, onward)      Start     Stop Route Frequency Ordered    02/11/25 2350  insulin aspart U-100 pen 0-10 Units         -- SubQ Before meals & nightly PRN 02/11/25 2252        Reports she only takes once weekly Mounjaro as outpatient   Start mod dose SSI inpatient   Monitor BG; hypoglycemia protocol   A1c with AM labs   Hold Oral hypoglycemics while patient is in the hospital.  "

## 2025-02-12 NOTE — SUBJECTIVE & OBJECTIVE
"Principal Problem:Arthritis of right hip    Principal Orthopedic Problem:  Right hip arthritis    Interval History:  No acute events overnight   Pain controlled on present regimen   Diet advancement tolerated   PT OT eval completed recommends low intensity with home health services    Review of patient's allergies indicates:   Allergen Reactions    Insulin lispro     Insulin regular human     Metformin     Nsaids (non-steroidal anti-inflammatory drug)     Statins-hmg-coa reductase inhibitors        Current Facility-Administered Medications   Medication    acetaminophen tablet 650 mg    acetaminophen tablet 650 mg    busPIRone split tablet 7.5 mg    chlorhexidine 0.12 % solution 10 mL    dextrose 50% injection 12.5 g    dextrose 50% injection 25 g    docusate sodium capsule 100 mg    gabapentin capsule 300 mg    glucagon (human recombinant) injection 1 mg    glucose chewable tablet 16 g    glucose chewable tablet 24 g    insulin aspart U-100 pen 0-10 Units    metoprolol tartrate (LOPRESSOR) tablet 50 mg    morphine injection 2 mg    ondansetron disintegrating tablet 4 mg    oxyCODONE-acetaminophen 5-325 mg per tablet 1 tablet    pantoprazole EC tablet 40 mg     Objective:     Vital Signs (Most Recent):  Temp: 98.9 °F (37.2 °C) (02/12/25 0747)  Pulse: 100 (02/12/25 0801)  Resp: 18 (02/12/25 0801)  BP: 120/62 (02/12/25 0747)  SpO2: 95 % (02/12/25 0801) Vital Signs (24h Range):  Temp:  [98 °F (36.7 °C)-98.9 °F (37.2 °C)] 98.9 °F (37.2 °C)  Pulse:  [] 100  Resp:  [8-80] 18  SpO2:  [88 %-100 %] 95 %  BP: (101-143)/(60-80) 120/62     Weight: 120.3 kg (265 lb 3.4 oz)  Height: 5' 3" (160 cm)  Body mass index is 46.98 kg/m².      Intake/Output Summary (Last 24 hours) at 2/12/2025 0827  Last data filed at 2/11/2025 2000  Gross per 24 hour   Intake 1100 ml   Output 1 ml   Net 1099 ml        General    Constitutional: She is oriented to person, place, and time. She appears well-developed and well-nourished.   HENT:   Head: " Normocephalic and atraumatic.   Eyes: Pupils are equal, round, and reactive to light.   Neck: Neck supple.   Pulmonary/Chest: Effort normal.   Abdominal: Soft.   Neurological: She is alert and oriented to person, place, and time.   Psychiatric: She has a normal mood and affect.             Right Hip Exam     Comments:  POD1 -     TOM in place, dressing C/D/I  No warmth/ erythema noted to surgical site  Sensation intact throughout extremity   Str 5/5 dorsal/plantar flexion   Cap refill <2   2 + pulses          Significant Labs: All pertinent labs within the past 24 hours have been reviewed.    Significant Imaging: I have reviewed all pertinent imaging results/findings.

## 2025-02-12 NOTE — PLAN OF CARE
[facility] - Telemetry (Hospital)  Discharge Final Note    Primary Care Provider: Heber Tuttle MD    Expected Discharge Date: 2/11/2025    Final Discharge Note (most recent)       Final Note - 02/12/25 0928          Final Note    Assessment Type Final Discharge Note     Anticipated Discharge Disposition Home-Health Care [facility]     Hospital Resources/Appts/Education Provided Appointments scheduled and added to AVS;Post-Acute resouces added to AVS        Post-Acute Status    Post-Acute Authorization Home Health;HME     HME Status Set-up Complete/Auth obtained     Home Health Status Set-up Complete/Auth obtained                     Important Message from Medicare              Follow-up providers       Jose R Ramon MD   Specialty: Orthopedic Surgery, General Surgery    [address] [place] [address] [address] [place] [address]   Phone: [phone]       Next Steps: Follow up in 2 week(s)              After-discharge care                Home Medical Care       *[facility]   Service: Home Health Services    [address] [facility]  [place] [address]   Phone: [phone]       Instructions: home health                          DC Dispo: home with [facility]  DME: walker delivered 2/11/25.

## 2025-02-12 NOTE — PROGRESS NOTES
AVS virtually reviewed with Sandra Sanders in its entirety with emphasis on diet, medications, follow-up appointments and reasons to return to the ED or contact the [facility] On Call Nurse Care Line. Patient also encouraged to utilize their patient portal. Ease and convenience of use reiterated. Education complete and patient voiced understanding. All questions answered. Discharge teaching complete.

## 2025-02-12 NOTE — SUBJECTIVE & OBJECTIVE
Past Medical History:   Diagnosis Date    Anxiety     Asthma     CHF (congestive heart failure)     Diabetes mellitus, type 2     GERD (gastroesophageal reflux disease)     Hyperlipidemia        Past Surgical History:   Procedure Laterality Date    BYPASS GRAFT  2013    CHOLECYSTECTOMY      CYST REMOVAL Right     HIP SURGERY Left     2009    HYSTERECTOMY      LAPAROSCOPIC SLEEVE GASTRECTOMY      LEFT HEART CATHETERIZATION  2025    MULTIPLE TOOTH EXTRACTIONS  2015    TUBAL LIGATION Bilateral 1982    [facility]       Review of patient's allergies indicates:   Allergen Reactions    Insulin lispro     Insulin regular human     Metformin     Nsaids (non-steroidal anti-inflammatory drug)     Statins-hmg-coa reductase inhibitors        No current facility-administered medications on file prior to encounter.     Current Outpatient Medications on File Prior to Encounter   Medication Sig    acetaminophen (TYLENOL) 500 MG tablet Take 1,000 mg by mouth every 6 (six) hours as needed for Pain.    metoprolol tartrate (LOPRESSOR) 50 MG tablet     ALPRAZolam (XANAX) 0.5 MG tablet SMARTSI Tablet(s) By Mouth Morning-Evening    ALPRAZolam (XANAX) 1 MG tablet     blood sugar diagnostic (TRUE METRIX GLUCOSE TEST STRIP) Strp USE AS DIRECTED FOR GLUCOSE MONITORING THREE TIMES DAILY    blood-glucose meter (TRUE METRIX GLUCOSE METER) Misc USE AS DIRECTED TO CHECK GLUCOSE TESTING THREE TIMES DAILY    busPIRone (BUSPAR) 7.5 MG tablet Take 7.5 mg by mouth once daily.    cetirizine (ZYRTEC) 10 MG tablet Take 1 tablet by mouth once daily.    cholecalciferol, vitamin D3, (DECARA) 50,000 unit capsule     clopidogreL (PLAVIX) 75 mg tablet Take 75 mg by mouth once daily.    dexlansoprazole (DEXILANT) 60 mg capsule Take 60 mg by mouth once daily.    dicyclomine (BENTYL) 20 mg tablet Take 20 mg by mouth as needed.    docusate sodium (COLACE) 100 MG capsule Take 1 capsule by mouth 2 (two) times daily as needed.     fluconazole (DIFLUCAN) 150 MG Tab Take 150 mg by mouth as needed.    fluticasone (FLONASE) 50 mcg/actuation nasal spray     folic acid (FOLVITE) 1 MG tablet Take 1 tablet by mouth every morning.    furosemide (LASIX) 40 MG tablet Take 1 tablet by mouth 2 (two) times daily.    gabapentin (NEURONTIN) 100 MG capsule Take 1 capsule by mouth 2 (two) times daily.    guaiFENesin (MUCINEX) 600 mg 12 hr tablet Take 600 mg by mouth as needed.    insulin aspart U-100 (NOVOLOG FLEXPEN U-100 INSULIN) 100 unit/mL InPn pen     insulin detemir U-100 (LEVEMIR FLEXTOUCH U-100 INSULN) 100 unit/mL (3 mL) SubQ InPn pen     Lactobac. rhamnosus GG-inulin 10 billion cell -200 mg CpSP Take by mouth once daily.    levocetirizine (XYZAL) 5 MG tablet     LIVALO 2 mg Tab tablet     methotrexate 2.5 MG Tab Take 8 tablets by mouth once a week.    MOUNJARO 7.5 mg/0.5 mL PnIj INJECT 7.5MG UNDER THE SKIN EVERY 7 DAYS    MOVANTIK 12.5 mg Tab Take 1 tablet by mouth every morning.    mupirocin (BACTROBAN) 2 % ointment Apply topically 3 (three) times daily.    nitrofurantoin, macrocrystal-monohydrate, (MACROBID) 100 MG capsule Take 100 mg by mouth 2 (two) times daily.    nitroGLYCERIN (NITROSTAT) 0.4 MG SL tablet Place 0.4 mg under the tongue.    ondansetron (ZOFRAN-ODT) 8 MG TbDL Take 8 mg by mouth as needed.    phenoL-glycerin (CHLORASEPTIC MAX SORE THROAT) 1.5-33 % [place] SPRAY  .    predniSONE (DELTASONE) 5 MG tablet Take 5 mg by mouth as needed.    promethazine (PHENERGAN) 25 MG tablet     QVAR 40 mcg/actuation Aero INHALE 2 PUFFS BY MOUTH TWICE DAILY    ROXICODONE 30 mg Tab     TRINTELLIX 10 mg Tab Take 1 tablet by mouth once daily.     Family History    None       Tobacco Use    Smoking status: Former    Smokeless tobacco: Never   Substance and Sexual Activity    Alcohol use: Not Currently    Drug use: Never    Sexual activity: Not on file     Review of Systems   Constitutional:  Negative for activity change, appetite change, fatigue and fever.    HENT:  Negative for congestion, rhinorrhea and sore throat.    Respiratory:  Negative for cough, chest tightness and shortness of breath.    Cardiovascular:  Positive for leg swelling. Negative for chest pain and palpitations.   Gastrointestinal:  Negative for abdominal pain, constipation, diarrhea, nausea and vomiting.   Genitourinary:  Negative for difficulty urinating, dysuria and hematuria.   Musculoskeletal:  Positive for arthralgias.   Skin:  Negative for rash.   Allergic/Immunologic: Negative.    Neurological:  Negative for dizziness, light-headedness and numbness.     Objective:     Vital Signs (Most Recent):  Temp: 98.8 °F (37.1 °C) (02/11/25 2001)  Pulse: (!) 111 (02/11/25 2001)  Resp: (!) 80 (02/11/25 2001)  BP: 124/71 (02/11/25 2001)  SpO2: 96 % (02/11/25 2001) Vital Signs (24h Range):  Temp:  [98 °F (36.7 °C)-98.8 °F (37.1 °C)] 98.8 °F (37.1 °C)  Pulse:  [] 111  Resp:  [8-80] 80  SpO2:  [88 %-100 %] 96 %  BP: (101-143)/(60-80) 124/71     Weight: 119.6 kg (263 lb 10.7 oz)  Body mass index is 46.71 kg/m².     Physical Exam  Vitals and nursing note reviewed.   Constitutional:       General: She is not in acute distress.     Appearance: She is obese. She is not ill-appearing.   Cardiovascular:      Rate and Rhythm: Regular rhythm. Tachycardia present.      Heart sounds: No murmur heard.  Pulmonary:      Effort: Pulmonary effort is normal.      Breath sounds: Normal breath sounds. No wheezing, rhonchi or rales.      Comments: On room air   Abdominal:      General: Bowel sounds are normal. There is no distension.      Palpations: Abdomen is soft.      Tenderness: There is no abdominal tenderness.   Musculoskeletal:      Right lower leg: Edema present.      Left lower leg: Edema present.      Comments: RLE knee immobilizer, TOM dressing in place, able to wiggle toes    Neurological:      Mental Status: She is alert and oriented to person, place, and time.          Significant Labs: All pertinent labs  within the past 24 hours have been reviewed.    Significant Imaging: I have reviewed all pertinent imaging results/findings within the past 24 hours.

## 2025-02-12 NOTE — ASSESSMENT & PLAN NOTE
- s/p R [facility] Dr. Ramon 02/11  - management per primary team  - DVT ppx per primary team    - encourage OOB and IS as able   - PT/OT - home with HH

## 2025-02-12 NOTE — PLAN OF CARE
A254/A254 GIBRAN  Sandra Sanders is a 69 y.o.female admitted on 2/11/2025 for Arthritis of right hip   Code Status: No Order MRN: [id number]   Review of patient's allergies indicates:   Allergen Reactions    Insulin lispro     Insulin regular human     Metformin     Nsaids (non-steroidal anti-inflammatory drug)     Statins-hmg-coa reductase inhibitors      Past Medical History:   Diagnosis Date    Anxiety     Asthma     CHF (congestive heart failure)     Diabetes mellitus, type 2     GERD (gastroesophageal reflux disease)     Hyperlipidemia       PRN meds    acetaminophen, 650 mg, Q8H PRN  acetaminophen, 650 mg, Q4H PRN  morphine, 2 mg, Q4H PRN  ondansetron, 4 mg, Q8H PRN  oxyCODONE-acetaminophen, 1 tablet, Q4H PRN      Chart check completed. Will continue plan of care.      Orientation: oriented x 4  Jane Coma Scale Score: 15     Lead Monitored: Lead II Rhythm: normal sinus rhythm       VTE Core Measure: (SCDs) Sequential compression device initiated/maintained Last Bowel Movement: 02/10/25  Diet diabetic 2000 Calories (up to 75 gm per meal); Standard Tray        Fall Risk Score: 3  Accucheck []   Freq?      Lines/Drains/Airways       Peripheral Intravenous Line  Duration                  Peripheral IV - Single Lumen 02/11/25 0932 20 G Left Antecubital <1 day         Peripheral IV - Single Lumen 02/11/25 0955 20 G Left Forearm <1 day

## 2025-02-12 NOTE — PLAN OF CARE
Problem: Adult Inpatient Plan of Care  Goal: Plan of Care Review  Outcome: Adequate for Care Transition  Goal: Patient-Specific Goal (Individualized)  Outcome: Adequate for Care Transition  Goal: Absence of Hospital-Acquired Illness or Injury  Outcome: Adequate for Care Transition  Goal: Optimal Comfort and Wellbeing  Outcome: Adequate for Care Transition  Goal: Readiness for Transition of Care  Outcome: Adequate for Care Transition     Problem: Diabetes Comorbidity  Goal: Blood Glucose Level Within Targeted Range  Outcome: Adequate for Care Transition     Problem: Bariatric Environmental Safety  Goal: Safety Maintained with Care  Outcome: Adequate for Care Transition     Problem: Wound  Goal: Optimal Coping  Outcome: Adequate for Care Transition  Goal: Optimal Functional Ability  Outcome: Adequate for Care Transition  Goal: Absence of Infection Signs and Symptoms  Outcome: Adequate for Care Transition  Goal: Improved Oral Intake  Outcome: Adequate for Care Transition  Goal: Optimal Pain Control and Function  Outcome: Adequate for Care Transition  Goal: Skin Health and Integrity  Outcome: Adequate for Care Transition  Goal: Optimal Wound Healing  Outcome: Adequate for Care Transition     Problem: Pain Acute  Goal: Optimal Pain Control and Function  Outcome: Adequate for Care Transition     Problem: Surgery Nonspecified  Goal: Absence of Bleeding  Outcome: Adequate for Care Transition  Goal: Effective Bowel Elimination  Outcome: Adequate for Care Transition  Goal: Fluid and Electrolyte Balance  Outcome: Adequate for Care Transition  Goal: Blood Glucose Level Within Targeted Range  Outcome: Adequate for Care Transition  Goal: Absence of Infection Signs and Symptoms  Outcome: Adequate for Care Transition  Goal: Anesthesia/Sedation Recovery  Outcome: Adequate for Care Transition  Goal: Optimal Pain Control and Function  Outcome: Adequate for Care Transition  Goal: Nausea and Vomiting Relief  Outcome: Adequate for Care  Transition  Goal: Effective Urinary Elimination  Outcome: Adequate for Care Transition  Goal: Effective Oxygenation and Ventilation  Outcome: Adequate for Care Transition     Problem: Fall Injury Risk  Goal: Absence of Fall and Fall-Related Injury  Outcome: Adequate for Care Transition

## 2025-02-12 NOTE — HPI
Pt is a 70 YO  female with PMH notable for T2DM, CAD s/p CABG, chronic HFpEF, RA who is admitted to the orthopedics surgery service. Underwent R BORIS with Dr. Ramon on 02/11/2025, hospital medicine consulted postoperatively for medical management. Pt seen and examined, resting comfortably. Reports some stiffness to R hip, pain controlled with current meds. Denies numbness/tingling to RLE. Denies CP, SOB. Does note some BLE edema, is off lasix. Denies nausea, vomiting, abd pain or difficulty urinating. Prior to surgery was in her usual state of health, reports has been off methotrexate for 7-8 days. Her primary cardiologist is Dr. Rony Khanna

## 2025-02-12 NOTE — PROGRESS NOTES
[facility] ([facility])  Orthopedics  Progress Note    Patient Name: Sandra Sanders  MRN: [id number]  Admission Date: 2/11/2025  Hospital Length of Stay: 0 days  Attending Provider: Jose R Ramon MD  Primary Care Provider: Heber Tuttle MD  Follow-up For: Procedure(s) (LRB):  ARTHROPLASTY, HIP (Right)    Post-Operative Day: 1 Day Post-Op  Subjective:     Principal Problem:Arthritis of right hip    Principal Orthopedic Problem:  Right hip arthritis    Interval History:  No acute events overnight   Pain controlled on present regimen   Diet advancement tolerated   PT OT eval completed recommends low intensity with home health services    Review of patient's allergies indicates:   Allergen Reactions    Insulin lispro     Insulin regular human     Metformin     Nsaids (non-steroidal anti-inflammatory drug)     Statins-hmg-coa reductase inhibitors        Current Facility-Administered Medications   Medication    acetaminophen tablet 650 mg    acetaminophen tablet 650 mg    busPIRone split tablet 7.5 mg    chlorhexidine 0.12 % solution 10 mL    dextrose 50% injection 12.5 g    dextrose 50% injection 25 g    docusate sodium capsule 100 mg    gabapentin capsule 300 mg    glucagon (human recombinant) injection 1 mg    glucose chewable tablet 16 g    glucose chewable tablet 24 g    insulin aspart U-100 pen 0-10 Units    metoprolol tartrate (LOPRESSOR) tablet 50 mg    morphine injection 2 mg    ondansetron disintegrating tablet 4 mg    oxyCODONE-acetaminophen 5-325 mg per tablet 1 tablet    pantoprazole EC tablet 40 mg     Objective:     Vital Signs (Most Recent):  Temp: 98.9 °F (37.2 °C) (02/12/25 0747)  Pulse: 100 (02/12/25 0801)  Resp: 18 (02/12/25 0801)  BP: 120/62 (02/12/25 0747)  SpO2: 95 % (02/12/25 0801) Vital Signs (24h Range):  Temp:  [98 °F (36.7 °C)-98.9 °F (37.2 °C)] 98.9 °F (37.2 °C)  Pulse:  [] 100  Resp:  [8-80] 18  SpO2:  [88 %-100 %] 95 %  BP: (101-143)/(60-80) 120/62     Weight:  "120.3 kg (265 lb 3.4 oz)  Height: 5' 3" (160 cm)  Body mass index is 46.98 kg/m².      Intake/Output Summary (Last 24 hours) at 2/12/2025 0827  Last data filed at 2/11/2025 2000  Gross per 24 hour   Intake 1100 ml   Output 1 ml   Net 1099 ml        General    Constitutional: She is oriented to person, place, and time. She appears well-developed and well-nourished.   HENT:   Head: Normocephalic and atraumatic.   Eyes: Pupils are equal, round, and reactive to light.   Neck: Neck supple.   Pulmonary/Chest: Effort normal.   Abdominal: Soft.   Neurological: She is alert and oriented to person, place, and time.   Psychiatric: She has a normal mood and affect.             Right Hip Exam     Comments:  POD1 -     TOM in place, dressing C/D/I  No warmth/ erythema noted to surgical site  Sensation intact throughout extremity   Str 5/5 dorsal/plantar flexion   Cap refill <2   2 + pulses          Significant Labs: All pertinent labs within the past 24 hours have been reviewed.    Significant Imaging: I have reviewed all pertinent imaging results/findings.  Assessment/Plan:     * Arthritis of right hip  POD1- R BORIS    Okay to DC home with home health services   Patient to wear knee immobilizer at night for 2 weeks   Patient will follow up in clinic in 2 weeks for further evaluation   She may call the clinic for any questions or concerns      Dr. Ramon is aware of the patient & current presentation. He agrees with the current plan above.       RORY Moraes  Orthopedics  [facility] ([facility])    "

## 2025-02-13 ENCOUNTER — TELEPHONE (OUTPATIENT)
Dept: ORTHOPEDICS | Facility: CLINIC | Age: 70
End: 2025-02-13
Payer: MEDICARE

## 2025-02-13 NOTE — ANESTHESIA POSTPROCEDURE EVALUATION
Anesthesia Post Evaluation    Patient: Sandra Sanders    Procedure(s) Performed: Procedure(s) (LRB):  ARTHROPLASTY, HIP (Right)    Final Anesthesia Type: general      Patient location during evaluation: PACU  Patient participation: Yes- Able to Participate  Level of consciousness: awake and alert, oriented and awake  Post-procedure vital signs: reviewed and stable  Pain management: adequate  Airway patency: patent    PONV status at discharge: No PONV  Anesthetic complications: no      Cardiovascular status: blood pressure returned to baseline  Respiratory status: unassisted  Hydration status: euvolemic  Follow-up not needed.              Vitals Value Taken Time   /62 02/12/25 0747   Temp 37.2 °C (98.9 °F) 02/12/25 0747   Pulse 100 02/12/25 0801   Resp 17 02/12/25 1324   SpO2 95 % 02/12/25 0801         Event Time   Out of Recovery 14:11:47         Pain/Trena Score: Pain Rating Prior to Med Admin: 8 (2/12/2025  1:24 PM)  Pain Rating Post Med Admin: 5 (2/12/2025 10:15 AM)

## 2025-02-13 NOTE — TELEPHONE ENCOUNTER
----- Message from [employer] sent at 2/13/2025 12:45 PM CST -----  .Type: Patient Call Back      Who called:Kelsey      What is the request in detail:  calling concerning letting doctor know that patient has been admitted to home health with physical therapist Kelsey.     Can the clinic reply by [facility]?         Would the patient rather a call back or a response via [facility]?  call    Best call back number:

## 2025-02-24 ENCOUNTER — HOSPITAL ENCOUNTER (OUTPATIENT)
Dept: RADIOLOGY | Facility: HOSPITAL | Age: 70
Discharge: HOME | End: 2025-02-24
Attending: ORTHOPAEDIC SURGERY
Payer: MEDICARE

## 2025-02-24 ENCOUNTER — OFFICE VISIT (OUTPATIENT)
Dept: ORTHOPEDICS | Facility: CLINIC | Age: 70
End: 2025-02-24
Payer: MEDICARE

## 2025-02-24 VITALS — WEIGHT: 265 LBS | BODY MASS INDEX: 46.95 KG/M2 | HEIGHT: 63 IN

## 2025-02-24 DIAGNOSIS — Z96.641 S/P TOTAL RIGHT HIP ARTHROPLASTY: Primary | ICD-10-CM

## 2025-02-24 DIAGNOSIS — M16.11 PRIMARY OSTEOARTHRITIS OF RIGHT HIP: ICD-10-CM

## 2025-02-24 PROCEDURE — 99024 POSTOP FOLLOW-UP VISIT: CPT | Mod: POP,,, | Performed by: PHYSICIAN ASSISTANT

## 2025-02-24 PROCEDURE — 99999 PR PBB SHADOW E&M-EST. PATIENT-LVL IV: CPT | Mod: PBBFAC,,, | Performed by: PHYSICIAN ASSISTANT

## 2025-02-24 PROCEDURE — 73502 X-RAY EXAM HIP UNI 2-3 VIEWS: CPT | Mod: 26,RT,, | Performed by: RADIOLOGY

## 2025-02-24 PROCEDURE — 99214 OFFICE O/P EST MOD 30 MIN: CPT | Mod: PBBFAC,25 | Performed by: PHYSICIAN ASSISTANT

## 2025-02-24 PROCEDURE — 73502 X-RAY EXAM HIP UNI 2-3 VIEWS: CPT | Mod: TC,RT

## 2025-02-24 NOTE — PROGRESS NOTES
Patient ID: Sandra Sanders is a 69 y.o. female.    Chief Complaint: Post-op Evaluation of the Right Hip      HPI: Sandra Sanders  is a 69 y.o. female who c/o Post-op Evaluation of the Right Hip     Post op visit 1   Patient notes pain is 7/10   The patient is doing okay although eager to make more advancement   She notes she has not walked without any devices in 14 years in his made this her goal now that she has had her hips replaced   She is on chronic pain medication prescribed by pain management which she sees this week; we will defer refills to their services   She is currently having home health and has 1 week left.  Once this complete she would like to go to outpatient PT at [facility].  She is eager to get in the tub.  I discussed with her that she can not get in to the pool for aqua therapy until after I see her at next visit to allow the skin time to heal.    Equipment she presents today in a wheelchair precaution; using a walker at home  DVT compliant  Therapy compliant    Aside patient states she feels that she may have a UTI.  She says she has been having a lot of urgency.  She had some left over Macrobid from previously and has been taking that.  She inquires about a refill of this medication.  I reviewed preop labs which showed no infection.  We will obtain a UA today at her request.  Further recommendations pending.  Additionally I did discuss with her to reach out to her PCP regarding this.    Patient is presently denying any shortness of breath, chest pain, fever/chills, nausea/vomiting, loss of taste or smell, numbness/tingling or sensation changes, loss of bladder or bowel function, loss of taste/smell.     Surgery: Right Total Hip    Surgery Date:  02/11/2025    Past Medical History:   Diagnosis Date    Anxiety     Asthma     CHF (congestive heart failure)     Diabetes mellitus, type 2     GERD (gastroesophageal reflux disease)     Hyperlipidemia      Past Surgical History:   Procedure  Laterality Date    BYPASS GRAFT  2013    CHOLECYSTECTOMY      CYST REMOVAL Right     HIP ARTHROPLASTY Right 2025    Procedure: ARTHROPLASTY, HIP;  Surgeon: Jose R Ramon MD;  Location: [facility] OR;  Service: Orthopedics;  Laterality: Right;    HIP SURGERY Left         HYSTERECTOMY      LAPAROSCOPIC SLEEVE GASTRECTOMY  2020    LEFT HEART CATHETERIZATION  2025    MULTIPLE TOOTH EXTRACTIONS  2015    TUBAL LIGATION Bilateral     [facility]     No family history on file.  Social History[1]  Medication List with Changes/Refills   Current Medications    ACETAMINOPHEN (TYLENOL) 500 MG TABLET    Take 1,000 mg by mouth every 6 (six) hours as needed for Pain.    ALPRAZOLAM (XANAX) 0.5 MG TABLET    SMARTSI Tablet(s) By Mouth Morning-Evening    ALPRAZOLAM (XANAX) 1 MG TABLET        BLOOD SUGAR DIAGNOSTIC (TRUE METRIX GLUCOSE TEST STRIP) STRP    USE AS DIRECTED FOR GLUCOSE MONITORING THREE TIMES DAILY    BLOOD-GLUCOSE METER (TRUE METRIX GLUCOSE METER) MISC    USE AS DIRECTED TO CHECK GLUCOSE TESTING THREE TIMES DAILY    BUSPIRONE (BUSPAR) 7.5 MG TABLET    Take 7.5 mg by mouth once daily.    CETIRIZINE (ZYRTEC) 10 MG TABLET    Take 1 tablet by mouth once daily.    CHOLECALCIFEROL, VITAMIN D3, (DECARA) 50,000 UNIT CAPSULE        CLOPIDOGREL (PLAVIX) 75 MG TABLET    Take 75 mg by mouth once daily.    DEXLANSOPRAZOLE (DEXILANT) 60 MG CAPSULE    Take 60 mg by mouth once daily.    DICYCLOMINE (BENTYL) 20 MG TABLET    Take 20 mg by mouth as needed.    DOCUSATE SODIUM (COLACE) 100 MG CAPSULE    Take 1 capsule by mouth 2 (two) times daily as needed.    FLUCONAZOLE (DIFLUCAN) 150 MG TAB    Take 150 mg by mouth as needed.    FLUTICASONE (FLONASE) 50 MCG/ACTUATION NASAL SPRAY        FOLIC ACID (FOLVITE) 1 MG TABLET    Take 1 tablet by mouth every morning.    FUROSEMIDE (LASIX) 40 MG TABLET    Take 1 tablet by mouth 2 (two) times daily.    GABAPENTIN (NEURONTIN) 100 MG CAPSULE    Take 1  capsule by mouth 2 (two) times daily.    GUAIFENESIN (MUCINEX) 600 MG 12 HR TABLET    Take 600 mg by mouth as needed.    INSULIN ASPART U-100 (NOVOLOG FLEXPEN U-100 INSULIN) 100 UNIT/ML INPN PEN        INSULIN DETEMIR U-100 (LEVEMIR FLEXTOUCH U-100 INSULN) 100 UNIT/ML (3 ML) SUBQ INPN PEN        LACTOBAC. RHAMNOSUS GG-INULIN 10 BILLION CELL -200 MG CPSP    Take by mouth once daily.    LEVOCETIRIZINE (XYZAL) 5 MG TABLET        LIVALO 2 MG TAB TABLET        METHOTREXATE 2.5 MG TAB    Take 8 tablets by mouth once a week.    METOPROLOL TARTRATE (LOPRESSOR) 50 MG TABLET        MOUNJARO 7.5 MG/0.5 ML PNIJ    INJECT 7.5MG UNDER THE SKIN EVERY 7 DAYS    MOVANTIK 12.5 MG TAB    Take 1 tablet by mouth every morning.    MUPIROCIN (BACTROBAN) 2 % OINTMENT    Apply topically 3 (three) times daily.    NITROFURANTOIN, MACROCRYSTAL-MONOHYDRATE, (MACROBID) 100 MG CAPSULE    Take 100 mg by mouth 2 (two) times daily.    NITROGLYCERIN (NITROSTAT) 0.4 MG SL TABLET    Place 0.4 mg under the tongue.    ONDANSETRON (ZOFRAN-ODT) 8 MG TBDL    Take 8 mg by mouth as needed.    PHENOL-GLYCERIN (CHLORASEPTIC MAX SORE THROAT) 1.5-33 % SPRY    SPRAY  .    PREDNISONE (DELTASONE) 5 MG TABLET    Take 5 mg by mouth as needed.    PROMETHAZINE (PHENERGAN) 25 MG TABLET        ROXICODONE 30 MG TAB        TRINTELLIX 10 MG TAB    Take 1 tablet by mouth once daily.     Review of patient's allergies indicates:   Allergen Reactions    Insulin lispro     Insulin regular human     Metformin     Nsaids (non-steroidal anti-inflammatory drug)     Statins-hmg-coa reductase inhibitors        IMAGING  FINDINGS:  Bilateral total hip arthroplasty changes stable in appearance.  No acute osseous abnormality.  Soft tissues unremarkable.     Impression:     As above    Objective:     Right Lower Extremity  NVI  WWP foot  Comp soft  Cap refill < 2 sec  Calf NT, Soft  (-) Cameron sign  TIFF  ROM : Patient is able to easily exhibit full flexion and extension on passive range of  "motion.   Abduction and adduction is full   Quad resistant full  Wiggles toes  DF/PF intact  Sensation intact  Inc C/D/I; subQ closure noted  No SOI    Assessment:       Encounter Diagnosis   Name Primary?    S/P total right hip arthroplasty Yes          Plan:       Sandra Georges" was seen today for post-op evaluation.    Diagnoses and all orders for this visit:    S/P total right hip arthroplasty        Sandra Daimarcelo is an established pt here for postop follow-up after right total hip replacement by Dr. Ramon.  Pain medication referrals being done by her established pain management doctor.  The incision was cleaned with hydrogen peroxide.  No staples were removed a subcu closure noted today.  The patient was instructed not to soak the incision in standing water but may clean the incision with clean running water and antibacterial soap.  Patient should notify the office of any signs or symptoms of infection including fevers, erythema, purulent drainage, increasing pain.  Patient will continue with DVT prophylaxis.  Patient will start outpatient physical therapy.  Will follow-up in 4-6 weeks.  Patient verbalized understanding of all instructions and agreed with the above plan.    No follow-ups on file.    The patient understands, chooses and consents to this plan and accepts all   the risks which include but are not limited to the risks mentioned above.     Disclaimer: This note was prepared using a voice recognition system and is likely to have sound alike errors within the text.            [1]   Social History  Socioeconomic History    Marital status:    Tobacco Use    Smoking status: Former    Smokeless tobacco: Never   Substance and Sexual Activity    Alcohol use: Not Currently    Drug use: Never     Social Drivers of Health     Financial Resource Strain: Medium Risk (2/11/2025)    Overall Financial Resource Strain (CARDIA)     Difficulty of Paying Living Expenses: Somewhat hard   Food " Insecurity: Food Insecurity Present (2/11/2025)    Hunger Vital Sign     Worried About Running Out of Food in the Last Year: Often true     Ran Out of Food in the Last Year: Often true   Transportation Needs: Unknown (4/15/2022)    Received from [facility] Subsidiaries and Affili[facility]    PRAPARE - Transportation     Lack of Transportation (Non-Medical): No   Stress: Stress Concern Present (2/11/2025)    [employer] [place] [employer] - Occupational Stress Questionnaire     Feeling of Stress : Rather much   Housing Stability: Low Risk  (2/11/2025)    Housing Stability Vital Sign     Unable to Pay for Housing in the Last Year: No     Homeless in the Last Year: No

## 2025-02-25 ENCOUNTER — RESULTS FOLLOW-UP (OUTPATIENT)
Dept: ORTHOPEDICS | Facility: CLINIC | Age: 70
End: 2025-02-25
Payer: MEDICARE

## 2025-02-25 RX ORDER — NITROFURANTOIN 25; 75 MG/1; MG/1
100 CAPSULE ORAL 2 TIMES DAILY
Qty: 20 CAPSULE | Refills: 0 | Status: SHIPPED | OUTPATIENT
Start: 2025-02-25

## 2025-02-25 NOTE — TELEPHONE ENCOUNTER
----- Message from RORY Clayton sent at 2/25/2025  9:21 AM CST -----  Her urine results show that she does have an infection   I have sent her an antibiotic to the pharmacy   Can you please call her and let her know   Please ask that she follow up with her primary care provider  ----- Message -----  From: [facility]  Sent: 2/25/2025   1:15 AM CST  To: RORY Olmstead

## 2025-02-25 NOTE — TELEPHONE ENCOUNTER
Spoke with the patient in regards to their lab results. Informed the patient that they do have an infection. Informed the patient that Mrs. Astrid Meyers PA-C sent in antibiotic to their pharmacy. Patient verbalized understanding.

## 2025-03-03 ENCOUNTER — TELEPHONE (OUTPATIENT)
Dept: ORTHOPEDICS | Facility: CLINIC | Age: 70
End: 2025-03-03
Payer: MEDICARE

## 2025-03-03 NOTE — TELEPHONE ENCOUNTER
----- Message from Monet sent at 3/3/2025  3:13 PM CST -----  Name of Who is Calling: Pt  What is the request in detail: Pt would like a call back in regards to questions from a scan from sx. Please advise thank you    Can the clinic reply by [facility]:no  What Number to Call Back [email or web address]:Telephone Information:[employer]          [phone]

## 2025-03-03 NOTE — TELEPHONE ENCOUNTER
Returned the patient's phone call in regards to their message. Patient states that they have a scab on their right hip that Dr. Ramon did surgery on. Patient states that they scab was not their at their first post-op appointment and that it just popped up on last night. Patient wanted to know if they can put neosporin on their hip. Informed the patient that per Dr. Ramon they can put neosporin. Informed the patient to monitor their incision. Patient verbalized understanding.

## 2025-03-10 DIAGNOSIS — Z96.641 S/P TOTAL RIGHT HIP ARTHROPLASTY: Primary | ICD-10-CM

## 2025-04-10 ENCOUNTER — EXTERNAL HOME HEALTH (OUTPATIENT)
Dept: HOME HEALTH SERVICES | Facility: HOSPITAL | Age: 70
End: 2025-04-10
Payer: MEDICARE

## 2025-04-10 ENCOUNTER — OFFICE VISIT (OUTPATIENT)
Dept: ORTHOPEDICS | Facility: CLINIC | Age: 70
End: 2025-04-10
Payer: MEDICARE

## 2025-04-10 VITALS — WEIGHT: 265 LBS | HEIGHT: 63 IN | BODY MASS INDEX: 46.95 KG/M2

## 2025-04-10 DIAGNOSIS — Z96.641 S/P TOTAL RIGHT HIP ARTHROPLASTY: Primary | ICD-10-CM

## 2025-04-10 PROCEDURE — 99999 PR PBB SHADOW E&M-EST. PATIENT-LVL IV: CPT | Mod: PBBFAC,,, | Performed by: PHYSICIAN ASSISTANT

## 2025-04-10 PROCEDURE — 99214 OFFICE O/P EST MOD 30 MIN: CPT | Mod: PBBFAC | Performed by: PHYSICIAN ASSISTANT

## 2025-04-10 PROCEDURE — 99024 POSTOP FOLLOW-UP VISIT: CPT | Mod: POP,,, | Performed by: PHYSICIAN ASSISTANT

## 2025-04-10 RX ORDER — LIDOCAINE 50 MG/G
1 PATCH TOPICAL DAILY
Qty: 30 PATCH | Refills: 11 | Status: SHIPPED | OUTPATIENT
Start: 2025-04-10

## 2025-04-10 NOTE — PROGRESS NOTES
Patient ID: Sandra Sanders is a 69 y.o. female.    Chief Complaint: Post-op Evaluation of the Right Hip      HPI: Sandra Sanders  is a 69 y.o. female who c/o Post-op Evaluation of the Right Hip     Post op visit 2  Patient notes pain is 3/10   The patient is doing quite well since surgery and is pleased with her results   She is thankful for us for having performed the surgery   She presents in a wheelchair today out of precaution as she states it is a long walk from the parking lot she wanted to surprised this by using her walker but she was also nervous   I can bend her for taking safety precautions and I am glad she is doing well   She is using a walker to assist with ambulation   Compliant with DVT prophylaxis as she is on Plavix  She is at PT at [facility] and is doing well she states they are also working on her low back issues    The patient states she feels she may have overdone it over the last few days as she was excited and forgot about her appointment today   She was doing some home exercises last night   She notes a little bit of soreness over the IT band  She states with her chronic medication she really does not want to add any medicine to her regimen; we discussed lidocaine patches and she is thankful for this    Patient is presently denying any shortness of breath, chest pain, fever/chills, nausea/vomiting, loss of taste or smell, numbness/tingling or sensation changes, loss of bladder or bowel function, loss of taste/smell.     Surgery: Right Total Hip    Surgery Date:  02/11/2025    Past Medical History:   Diagnosis Date    Anxiety     Asthma     CHF (congestive heart failure)     Diabetes mellitus, type 2     GERD (gastroesophageal reflux disease)     Hyperlipidemia      Past Surgical History:   Procedure Laterality Date    BYPASS GRAFT  08/06/2013    CHOLECYSTECTOMY  2023    CYST REMOVAL Right 1995    HIP ARTHROPLASTY Right 2/11/2025    Procedure: ARTHROPLASTY, HIP;  Surgeon: Tristen  Jose R RODRIGUES MD;  Location: [facility] OR;  Service: Orthopedics;  Laterality: Right;    HIP SURGERY Left     2009    HYSTERECTOMY      LAPAROSCOPIC SLEEVE GASTRECTOMY      LEFT HEART CATHETERIZATION  2025    MULTIPLE TOOTH EXTRACTIONS  2015    TUBAL LIGATION Bilateral     [facility]     No family history on file.  Social History[1]  Medication List with Changes/Refills   Current Medications    ACETAMINOPHEN (TYLENOL) 500 MG TABLET    Take 1,000 mg by mouth every 6 (six) hours as needed for Pain.    ALPRAZOLAM (XANAX) 0.5 MG TABLET    SMARTSI Tablet(s) By Mouth Morning-Evening    ALPRAZOLAM (XANAX) 1 MG TABLET        BLOOD SUGAR DIAGNOSTIC (TRUE METRIX GLUCOSE TEST STRIP) STRP    USE AS DIRECTED FOR GLUCOSE MONITORING THREE TIMES DAILY    BLOOD-GLUCOSE METER (TRUE METRIX GLUCOSE METER) MISC    USE AS DIRECTED TO CHECK GLUCOSE TESTING THREE TIMES DAILY    BUSPIRONE (BUSPAR) 7.5 MG TABLET    Take 7.5 mg by mouth once daily.    CETIRIZINE (ZYRTEC) 10 MG TABLET    Take 1 tablet by mouth once daily.    CHOLECALCIFEROL, VITAMIN D3, (DECARA) 50,000 UNIT CAPSULE        CLOPIDOGREL (PLAVIX) 75 MG TABLET    Take 75 mg by mouth once daily.    DEXLANSOPRAZOLE (DEXILANT) 60 MG CAPSULE    Take 60 mg by mouth once daily.    DICYCLOMINE (BENTYL) 20 MG TABLET    Take 20 mg by mouth as needed.    DOCUSATE SODIUM (COLACE) 100 MG CAPSULE    Take 1 capsule by mouth 2 (two) times daily as needed.    FLUCONAZOLE (DIFLUCAN) 150 MG TAB    Take 150 mg by mouth as needed.    FLUTICASONE (FLONASE) 50 MCG/ACTUATION NASAL SPRAY        FOLIC ACID (FOLVITE) 1 MG TABLET    Take 1 tablet by mouth every morning.    FUROSEMIDE (LASIX) 40 MG TABLET    Take 1 tablet by mouth 2 (two) times daily.    GABAPENTIN (NEURONTIN) 100 MG CAPSULE    Take 1 capsule by mouth 2 (two) times daily.    GUAIFENESIN (MUCINEX) 600 MG 12 HR TABLET    Take 600 mg by mouth as needed.    INSULIN ASPART U-100 (NOVOLOG FLEXPEN U-100 INSULIN) 100 UNIT/ML  "INPN PEN        INSULIN DETEMIR U-100 (LEVEMIR FLEXTOUCH U-100 INSULN) 100 UNIT/ML (3 ML) SUBQ INPN PEN        LACTOBAC. RHAMNOSUS GG-INULIN 10 BILLION CELL -200 MG CPSP    Take by mouth once daily.    LEVOCETIRIZINE (XYZAL) 5 MG TABLET        LIVALO 2 MG TAB TABLET        METHOTREXATE 2.5 MG TAB    Take 8 tablets by mouth once a week.    METOPROLOL TARTRATE (LOPRESSOR) 50 MG TABLET        MOUNJARO 7.5 MG/0.5 ML PNIJ    INJECT 7.5MG UNDER THE SKIN EVERY 7 DAYS    MOVANTIK 12.5 MG TAB    Take 1 tablet by mouth every morning.    MUPIROCIN (BACTROBAN) 2 % OINTMENT    Apply topically 3 (three) times daily.    NITROFURANTOIN, MACROCRYSTAL-MONOHYDRATE, (MACROBID) 100 MG CAPSULE    Take 1 capsule (100 mg total) by mouth 2 (two) times daily.    NITROGLYCERIN (NITROSTAT) 0.4 MG SL TABLET    Place 0.4 mg under the tongue.    ONDANSETRON (ZOFRAN-ODT) 8 MG TBDL    Take 8 mg by mouth as needed.    PHENOL-GLYCERIN (CHLORASEPTIC MAX SORE THROAT) 1.5-33 % SPRY    SPRAY  .    PREDNISONE (DELTASONE) 5 MG TABLET    Take 5 mg by mouth as needed.    PROMETHAZINE (PHENERGAN) 25 MG TABLET        ROXICODONE 30 MG TAB        TRINTELLIX 10 MG TAB    Take 1 tablet by mouth once daily.     Review of patient's allergies indicates:   Allergen Reactions    Insulin lispro     Insulin regular human     Metformin     Nsaids (non-steroidal anti-inflammatory drug)     Statins-hmg-coa reductase inhibitors        Objective:     Right Lower Extremity  NVI  WWP foot  Comp soft  Cap refill < 2 sec  Calf NT, soft  (-) Cameron sign  TIFF  ROM : Patient is able to easily exhibit full flexion and extension on passive range of motion.   Abduction and adduction is full   Quadrants a simple  Wiggles toes  DF/PF intact  Sensation intact  Inc C/D/I  No SOI    Imaging:    No imaging obtained today    Assessment:       Encounter Diagnosis   Name Primary?    S/P total right hip arthroplasty Yes          Plan:       Sandra Georges" was seen today for post-op " evaluation.    Diagnoses and all orders for this visit:    S/P total right hip arthroplasty        Sandra Sanders is an established pt here for postop follow-up after right total hip replacement by Dr. Ramon.  The patient will continue the current medication regimen and treatment plan.  Patient should notify the office of any signs or symptoms of infection including fevers, erythema, purulent drainage, increasing pain.  Patient will continue with DVT prophylaxis until at least 6 weeks postop.  Patient will continue outpatient physical therapy.  Will follow-up as scheduled. Patient verbalized understanding of all instructions and agreed with the above plan.    No follow-ups on file.    The patient understands, chooses and consents to this plan and accepts all   the risks which include but are not limited to the risks mentioned above.     Disclaimer: This note was prepared using a voice recognition system and is likely to have sound alike errors within the text.            [1]   Social History  Socioeconomic History    Marital status:    Tobacco Use    Smoking status: Former    Smokeless tobacco: Never   Substance and Sexual Activity    Alcohol use: Not Currently    Drug use: Never     Social Drivers of Health     Financial Resource Strain: Medium Risk (2/11/2025)    Overall Financial Resource Strain (CARDIA)     Difficulty of Paying Living Expenses: Somewhat hard   Food Insecurity: Food Insecurity Present (2/11/2025)    Hunger Vital Sign     Worried About Running Out of Food in the Last Year: Often true     Ran Out of Food in the Last Year: Often true   Transportation Needs: Unknown (4/15/2022)    Received from [facility] and [facility]    PRAPARE - Transportation     Lack of Transportation (Non-Medical): No   Stress: Stress Concern Present (2/11/2025)    [employer] [place] [employer] - Occupational Stress Questionnaire      Feeling of Stress : Rather much   Housing Stability: Low Risk  (2/11/2025)    Housing Stability Vital Sign     Unable to Pay for Housing in the Last Year: No     Homeless in the Last Year: No

## 2025-04-16 ENCOUNTER — TELEPHONE (OUTPATIENT)
Dept: ORTHOPEDICS | Facility: CLINIC | Age: 70
End: 2025-04-16
Payer: MEDICARE

## 2025-04-16 DIAGNOSIS — Z96.641 S/P TOTAL RIGHT HIP ARTHROPLASTY: Primary | ICD-10-CM

## 2025-04-16 NOTE — TELEPHONE ENCOUNTER
Called and spoke with the patient in regards to their appointment on 5/16/25 Dr. Ramon. Informed the patient that I need to reschedule their appointment due to the fact that Dr. Ramon is on trauma call for the hospital that day. Informed the patient that we are moving all patient's from 5/16/25 to the following Friday 5/23/25. Patient has been reschedule for  the following Friday 5/23/25 at 10:20 with a 10:00 x-rays. Patient verbalized understanding.

## 2025-04-16 NOTE — TELEPHONE ENCOUNTER
Can you put in a new order for Jeanine BRIGHT on [place] for aquatic therapy total right hip arthroplasty on 2/11/25

## 2025-05-23 ENCOUNTER — OFFICE VISIT (OUTPATIENT)
Dept: ORTHOPEDICS | Facility: CLINIC | Age: 70
End: 2025-05-23
Payer: MEDICARE

## 2025-05-23 ENCOUNTER — HOSPITAL ENCOUNTER (OUTPATIENT)
Dept: RADIOLOGY | Facility: HOSPITAL | Age: 70
Discharge: HOME | End: 2025-05-23
Attending: ORTHOPAEDIC SURGERY
Payer: MEDICARE

## 2025-05-23 VITALS — WEIGHT: 255 LBS | BODY MASS INDEX: 45.18 KG/M2 | HEIGHT: 63 IN

## 2025-05-23 DIAGNOSIS — Z96.642 HX OF TOTAL HIP ARTHROPLASTY, LEFT: ICD-10-CM

## 2025-05-23 DIAGNOSIS — E66.01 MORBID OBESITY WITH BMI OF 45.0-49.9, ADULT: ICD-10-CM

## 2025-05-23 DIAGNOSIS — Z96.641 S/P TOTAL RIGHT HIP ARTHROPLASTY: Primary | ICD-10-CM

## 2025-05-23 DIAGNOSIS — M16.11 PRIMARY OSTEOARTHRITIS OF RIGHT HIP: ICD-10-CM

## 2025-05-23 PROCEDURE — 99999 PR PBB SHADOW E&M-EST. PATIENT-LVL IV: CPT | Mod: PBBFAC,,, | Performed by: ORTHOPAEDIC SURGERY

## 2025-05-23 PROCEDURE — 73502 X-RAY EXAM HIP UNI 2-3 VIEWS: CPT | Mod: TC,RT

## 2025-05-23 PROCEDURE — 99214 OFFICE O/P EST MOD 30 MIN: CPT | Mod: PBBFAC,25 | Performed by: ORTHOPAEDIC SURGERY

## 2025-05-23 PROCEDURE — 73502 X-RAY EXAM HIP UNI 2-3 VIEWS: CPT | Mod: 26,RT,, | Performed by: RADIOLOGY

## 2025-05-23 NOTE — PROGRESS NOTES
Subjective:     Patient ID: Sandra Sanders is a 69 y.o. female.    Chief Complaint: Pain and Post-op Evaluation of the Right Hip    HPI:  01/06/2025    Patient presents as a new patient to me today with chief complaint of right hip pain.  Patient notes pain is at worst a 10/10 affecting activity of daily living and thus her quality of life.  She presents today in a wheelchair she was unsure how long she had to walk.  Patient states she is only able to ambulate with a walker /Rollator and even then short distances at best.  She has a hard time going from a sitting to standing or standing to seated position, unlevel terrain or stairs.  Difficulty with putting shoes and socks.  The patient states she has been struggling with her hip for some time and was actually due to have a hip replacement shortly after her left hip which performed in 2009 by Dr. J Ochsner.  She states given the complication and having to go back into surgery her and her  decided to wait on the right until it became completely unbearable which it has been.  Patient  has passed away and she is on her own.  She does have a daughter that could help.  She has been followed by Dr. Voss and Dr. Ocampo who wanted her to lose weight prior to surgery and wanted to fix her left shoulder.  Has been in therapy since 2017 with [facility] doing aqua therapy 3 times a week, received multiple hip injections as well as a variety of medications with minimal at best relief.  The patient states she was deemed not a surgical candidate after losing 80 lb by his office and it was started she can no longer live like this at her age once more for her life.  She reaches out today for a 2nd opinion.     She is under pain management receives her oxycodone, prednisone as needed, methotrexate and diagnosed with peripheral neuropathy and on gabapentin.  Patient underwent left total hip by Dr. J Ochsner December of 2009 per [facility] review.  Patient was  taken back roughly 1 week later for concern of hematoma and washout was performed.  No infection was noted at the time.  She states she has had no pain, injury, complication or complaint of the left hip     Patient has been in therapy with [facility] since 2017 doing aqua therapy 3 times a week  She has received multiple hip injections with minimal at best relief  Additionally she does note sciatica to the left side radiating down her leg and knows she has back issues      Additionally patient had a double bypass in 2013 and is followed by Dr. Khanna for cardiology   She has changed her PCP to Dr. Tuttle  in as Dr. Iverson has retired     Patient is presently denying any shortness of breath, chest pain, fever/chills, nausea/vomiting, difficulty with chewing or swallowing loss of bowel bladder control blurry vision double vision loss sense smell or taste  Patient is here with a daughter/nurse Althea    Patient had open heart surgery in 2013.  Had stress test recently by Dr. lionel Uribe .  It weights clearance to undergo surgery      05/23/2025   Date of surgery 02/11/2025 right total hip arthroplasty  Pain is 0/10.  Patient was crying in a room stating I gave her a life back.  She is going to physical therapy at [facility] on [facility].  She is doing therapy 3 times a week she is markedly improved.  Showed me videos about her walking without assistive devices with the therapist.  She takes her Rollator out because of her back.  Marked improvement.  She can get into the pool and exercise once a week.  She would like to continue going to get strength now that she is not stuck in a wheelchair.  I did tell her it will take a long time to get full strength back and she is almost tear at this time.  She is happy with the results.  She is willing in the future to pay monthly feed to continue doing her exercises at the facility   No fever no chills no shortness breath or difficulty with chewing swallowing loss of bowel bladder  control   She did lose 8 lb since surgery by being more  Past Medical History:   Diagnosis Date    Anxiety     Asthma     CHF (congestive heart failure)     Diabetes mellitus, type 2     GERD (gastroesophageal reflux disease)     Hyperlipidemia      Past Surgical History:   Procedure Laterality Date    BYPASS GRAFT  08/06/2013    CHOLECYSTECTOMY  2023    CYST REMOVAL Right 1995    HIP ARTHROPLASTY Right 2/11/2025    Procedure: ARTHROPLASTY, HIP;  Surgeon: Jose R Ramon MD;  Location: [facility];  Service: Orthopedics;  Laterality: Right;    HIP SURGERY Left     2009    HYSTERECTOMY  2015    LAPAROSCOPIC SLEEVE GASTRECTOMY  2020    LEFT HEART CATHETERIZATION  01/17/2025    MULTIPLE TOOTH EXTRACTIONS  03/2015    TUBAL LIGATION Bilateral 1982    [facility]     No family history on file.  Social History     Socioeconomic History    Marital status:    Tobacco Use    Smoking status: Former    Smokeless tobacco: Never   Substance and Sexual Activity    Alcohol use: Not Currently    Drug use: Never     Social Drivers of Health     Financial Resource Strain: Medium Risk (2/11/2025)    Overall Financial Resource Strain (CARDIA)     Difficulty of Paying Living Expenses: Somewhat hard   Food Insecurity: Food Insecurity Present (2/11/2025)    Hunger Vital Sign     Worried About Running Out of Food in the Last Year: Often true     Ran Out of Food in the Last Year: Often true   Transportation Needs: Unknown (4/15/2022)    Received from [facility]    PRAPARE - Transportation     Lack of Transportation (Non-Medical): No   Stress: Stress Concern Present (2/11/2025)    [employer] [place] [employer] - Occupational Stress Questionnaire     Feeling of Stress : Rather much   Housing Stability: Low Risk  (2/11/2025)    Housing Stability Vital Sign     Unable to Pay for Housing in the Last Year: No     Homeless in the Last Year: No      Medication List with Changes/Refills   Current Medications    ACETAMINOPHEN (TYLENOL) 500 MG TABLET    Take 1,000 mg by mouth every 6 (six) hours as needed for Pain.    ALPRAZOLAM (XANAX) 0.5 MG TABLET    SMARTSI Tablet(s) By Mouth Morning-Evening    ALPRAZOLAM (XANAX) 1 MG TABLET        BLOOD SUGAR DIAGNOSTIC (TRUE METRIX GLUCOSE TEST STRIP) STRP    USE AS DIRECTED FOR GLUCOSE MONITORING THREE TIMES DAILY    BLOOD-GLUCOSE METER (TRUE METRIX GLUCOSE METER) MISC    USE AS DIRECTED TO CHECK GLUCOSE TESTING THREE TIMES DAILY    BUSPIRONE (BUSPAR) 7.5 MG TABLET    Take 7.5 mg by mouth once daily.    CETIRIZINE (ZYRTEC) 10 MG TABLET    Take 1 tablet by mouth once daily.    CHOLECALCIFEROL, VITAMIN D3, (DECARA) 50,000 UNIT CAPSULE        CLOPIDOGREL (PLAVIX) 75 MG TABLET    Take 75 mg by mouth once daily.    DEXLANSOPRAZOLE (DEXILANT) 60 MG CAPSULE    Take 60 mg by mouth once daily.    DICYCLOMINE (BENTYL) 20 MG TABLET    Take 20 mg by mouth as needed.    DOCUSATE SODIUM (COLACE) 100 MG CAPSULE    Take 1 capsule by mouth 2 (two) times daily as needed.    FLUCONAZOLE (DIFLUCAN) 150 MG TAB    Take 150 mg by mouth as needed.    FLUTICASONE (FLONASE) 50 MCG/ACTUATION NASAL SPRAY        FOLIC ACID (FOLVITE) 1 MG TABLET    Take 1 tablet by mouth every morning.    FUROSEMIDE (LASIX) 40 MG TABLET    Take 1 tablet by mouth 2 (two) times daily.    GABAPENTIN (NEURONTIN) 100 MG CAPSULE    Take 1 capsule by mouth 2 (two) times daily.    GUAIFENESIN (MUCINEX) 600 MG 12 HR TABLET    Take 600 mg by mouth as needed.    INSULIN ASPART U-100 (NOVOLOG FLEXPEN U-100 INSULIN) 100 UNIT/ML INPN PEN        INSULIN DETEMIR U-100 (LEVEMIR FLEXTOUCH U-100 INSULN) 100 UNIT/ML (3 ML) SUBQ INPN PEN        LACTOBAC. RHAMNOSUS GG-INULIN 10 BILLION CELL -200 MG CPSP    Take by mouth once daily.    LEVOCETIRIZINE (XYZAL) 5 MG TABLET        LIDOCAINE (LIDODERM) 5 %    Place 1 patch onto the skin once daily. Remove & Discard patch within 12 hours or  as directed by MD    LIVALO 2 MG TAB TABLET        METHOTREXATE 2.5 MG TAB    Take 8 tablets by mouth once a week.    METOPROLOL TARTRATE (LOPRESSOR) 50 MG TABLET        MOUNJARO 7.5 MG/0.5 ML PNIJ    INJECT 7.5MG UNDER THE SKIN EVERY 7 DAYS    MOVANTIK 12.5 MG TAB    Take 1 tablet by mouth every morning.    MUPIROCIN (BACTROBAN) 2 % OINTMENT    Apply topically 3 (three) times daily.    NITROFURANTOIN, MACROCRYSTAL-MONOHYDRATE, (MACROBID) 100 MG CAPSULE    Take 1 capsule (100 mg total) by mouth 2 (two) times daily.    NITROGLYCERIN (NITROSTAT) 0.4 MG SL TABLET    Place 0.4 mg under the tongue.    ONDANSETRON (ZOFRAN-ODT) 8 MG TBDL    Take 8 mg by mouth as needed.    PHENOL-GLYCERIN (CHLORASEPTIC MAX SORE THROAT) 1.5-33 % SPRY    SPRAY  .    PREDNISONE (DELTASONE) 5 MG TABLET    Take 5 mg by mouth as needed.    PROMETHAZINE (PHENERGAN) 25 MG TABLET        ROXICODONE 30 MG TAB        TRINTELLIX 10 MG TAB    Take 1 tablet by mouth once daily.     Review of patient's allergies indicates:   Allergen Reactions    Insulin lispro     Insulin regular human     Metformin     Nsaids (non-steroidal anti-inflammatory drug)     Statins-hmg-coa reductase inhibitors      Review of Systems   Constitutional: Negative for decreased appetite.   HENT:  Negative for tinnitus.    Eyes:  Negative for double vision.   Cardiovascular:  Negative for chest pain.   Respiratory:  Negative for wheezing.    Hematologic/Lymphatic: Negative for bleeding problem.   Skin:  Negative for dry skin.   Musculoskeletal:  Positive for arthritis, back pain and muscle weakness. Negative for gout, joint pain, joint swelling, myalgias, neck pain and stiffness.   Gastrointestinal:  Negative for abdominal pain.   Genitourinary:  Negative for bladder incontinence.   Neurological:  Negative for numbness, paresthesias and sensory change.   Psychiatric/Behavioral:  Negative for altered mental status.        Objective:   Body mass index is 45.17 kg/m².  There were no  vitals filed for this visit.       General    Constitutional: She is oriented to person, place, and time. She appears well-developed.   HENT:   Head: Atraumatic.   Eyes: EOM are normal.   Pulmonary/Chest: Effort normal.   Neurological: She is alert and oriented to person, place, and time.   Psychiatric: Judgment normal.           In a wheelchair.  She has a Rollator at home.    Pelvis in the sitting position is level   Left hip posterior approach passive internal external rotation with excellent motion no pain in the groin.  Able to do hip flexing and abducting and adducting with strength at 5/5    Right hip preop is stuck at 0° of internal rotation with pelvis tilting.  External rotation 25° abduction 30° with around 15-20 degrees of flexion contracture.  There is quite a bit of pain in the posterior hip joint and slightly anterior hip joint.  Right hip postop TKA passive internal rotation of 25° external rotation 45° without any flexion contractures.  Hip flexors and abductors and adductors are very slightly weak at 5-/5 which is a marked improvement compared to preop     Bilateral knees full extension and flexion.  No defect in the patella or quadriceps tendon.  Collaterals and cruciates stable.    Ankle motion is intact it goes up and down without difficulty.  There is some mild pitting edema  Skin is warm to touch no obvious lesions    Relevant imaging results reviewed and interpreted by me, discussed with the patient and / or family today   X-ray 5/23/25 pelvis and right hip showing right BORIS in excellent alignment no evidence of failure.  She does have a left total hip also with without any evidence of failure.  X-ray 10/17/2024 left BORIS/Dillan according to the patient in excellent alignment.  No evidence of poly wear.  X-ray 10/17/2024 right hip with complete loss of joint space.  Basically fused hip.  Cystic changes in the bone osteophytic changes osteopenic bone consistent with severe arthritis of the  right hip  Assessment:     Encounter Diagnoses   Name Primary?    S/P total right hip arthroplasty Yes    Hx of total hip arthroplasty, left     Morbid obesity with BMI of 45.0-49.9, adult         Plan:   S/P total right hip arthroplasty    Hx of total hip arthroplasty, left    Morbid obesity with BMI of 45.0-49.9, adult         Patient Instructions   X-ray show excellent alignment of both your hips   You showed me a video that you are walking without assistive devices which is new for you   You have your Rollator with the you for safety purposes   You strength is getting better  I would like you to advance like stationary bicycle maybe walking on the treadmill at slow pace  You can work on [employer] to strengthen the quads in the hamstrings  Avoid squats and lunges  I will see you back it within 3 months to re-evaluate  Continue therapy at [facility] [place] physical therapy on [facility]  You would benefit from aquatic physical therapy        Disclaimer: This note was prepared using a voice recognition system and is likely to have sound alike errors within the text.

## 2025-05-23 NOTE — PATIENT INSTRUCTIONS
X-ray show excellent alignment of both your hips   You showed me a video that you are walking without assistive devices which is new for you   You have your Rollator with the you for safety purposes   You strength is getting better  I would like you to advance like stationary bicycle maybe walking on the treadmill at slow pace  You can work on [employer] to strengthen the quads in the hamstrings  Avoid squats and lunges  I will see you back it within 3 months to re-evaluate  Continue therapy at [facility] physical therapy on [facility][note 3]  You would benefit from aquatic physical therapy

## 2025-08-11 ENCOUNTER — TELEPHONE (OUTPATIENT)
Dept: ORTHOPEDICS | Facility: CLINIC | Age: 70
End: 2025-08-11
Payer: MEDICARE

## 2025-08-21 ENCOUNTER — TELEPHONE (OUTPATIENT)
Dept: ORTHOPEDICS | Facility: CLINIC | Age: 70
End: 2025-08-21
Payer: MEDICARE

## (undated) DEVICE — SYS SURGIPHOR STRL IRRG

## (undated) DEVICE — HOOD FLYTE PEELWY STERISHIELD

## (undated) DEVICE — DRAPE HIP PCH 112X137X89IN

## (undated) DEVICE — PAD ABDOMINAL STERILE 8X10IN

## (undated) DEVICE — ALCOHOL ISOPROPYL 4OZ

## (undated) DEVICE — POSITIONER PINKPROTECT ARC MED

## (undated) DEVICE — NDL SPINAL 18GX3.5 SPINOCAN

## (undated) DEVICE — IMMOB KNEE UNIV TRI PANEL 19IN

## (undated) DEVICE — APPLICATOR CHLORAPREP ORN 26ML

## (undated) DEVICE — SOCKINETTE IMPERVIOUS 12X48IN

## (undated) DEVICE — PACK BASIC SETUP SC BR

## (undated) DEVICE — KIT IRR SUCTION HND PIECE

## (undated) DEVICE — COVER TABLE HVY DTY 60X90IN

## (undated) DEVICE — BLADE EZ CLEAN 2 1/2

## (undated) DEVICE — DRAPE THREE-QTR REINF 53X77IN

## (undated) DEVICE — BLADE SAG 18.0X1.27X100

## (undated) DEVICE — BNDG COFLEX FOAM LF2 ST 4X5YD

## (undated) DEVICE — UNDERGLOVES BIOGEL PI SIZE 7.5

## (undated) DEVICE — SUT VICRYL 1 OB 36 CTX

## (undated) DEVICE — SPONGE COTTON TRAY 4X4IN

## (undated) DEVICE — GOWN NONREINF SET-IN SLV 2XL

## (undated) DEVICE — DRAPE INCISE IOBAN 2 23X33IN

## (undated) DEVICE — PAD CONVOLUTED 34X72

## (undated) DEVICE — ELECTRODE REM PLYHSV RETURN 9

## (undated) DEVICE — GLOVE SURG PLYSPHRN ORTH SZ7.5

## (undated) DEVICE — COVER LIGHT HANDLE 80/CA

## (undated) DEVICE — ELECTRODE BLD EXT 6.50 ST DISP

## (undated) DEVICE — TAPE SILK 3IN

## (undated) DEVICE — SYR 30CC LUER LOCK

## (undated) DEVICE — POUCH INSTRUMENT 2 POCKET

## (undated) DEVICE — DRAPE U SPLIT SHEET 54X76IN

## (undated) DEVICE — SOL NACL IRR 3000ML

## (undated) DEVICE — DRAPE STERI U-SHAPED 47X51IN

## (undated) DEVICE — TOWEL OR DISP STRL BLUE 4/PK

## (undated) DEVICE — COVER PROXIMA MAYO STAND

## (undated) DEVICE — DRAPE FULL SHEET 70X100IN

## (undated) DEVICE — MANIFOLD 4 PORT